# Patient Record
Sex: FEMALE | Race: WHITE
[De-identification: names, ages, dates, MRNs, and addresses within clinical notes are randomized per-mention and may not be internally consistent; named-entity substitution may affect disease eponyms.]

---

## 2017-09-19 ENCOUNTER — HOSPITAL ENCOUNTER (INPATIENT)
Dept: HOSPITAL 92 - SURG A | Age: 72
LOS: 9 days | Discharge: HOME | DRG: 470 | End: 2017-09-28
Attending: ORTHOPAEDIC SURGERY | Admitting: ORTHOPAEDIC SURGERY
Payer: MEDICARE

## 2017-09-19 VITALS — BODY MASS INDEX: 37.8 KG/M2

## 2017-09-19 DIAGNOSIS — I10: ICD-10-CM

## 2017-09-19 DIAGNOSIS — M17.0: Primary | ICD-10-CM

## 2017-09-19 DIAGNOSIS — M10.9: ICD-10-CM

## 2017-09-19 DIAGNOSIS — E66.9: ICD-10-CM

## 2017-09-19 DIAGNOSIS — E78.5: ICD-10-CM

## 2017-09-19 DIAGNOSIS — R12: ICD-10-CM

## 2017-09-19 PROCEDURE — 90471 IMMUNIZATION ADMIN: CPT

## 2017-09-19 PROCEDURE — 36415 COLL VENOUS BLD VENIPUNCTURE: CPT

## 2017-09-19 PROCEDURE — 90732 PPSV23 VACC 2 YRS+ SUBQ/IM: CPT

## 2017-09-19 PROCEDURE — A4306 DRUG DELIVERY SYSTEM <=50 ML: HCPCS

## 2017-09-19 PROCEDURE — 85027 COMPLETE CBC AUTOMATED: CPT

## 2017-09-19 PROCEDURE — C1713 ANCHOR/SCREW BN/BN,TIS/BN: HCPCS

## 2017-09-19 PROCEDURE — C1776 JOINT DEVICE (IMPLANTABLE): HCPCS

## 2017-09-19 PROCEDURE — G0009 ADMIN PNEUMOCOCCAL VACCINE: HCPCS

## 2017-09-26 PROCEDURE — 8E0YXBZ COMPUTER ASSISTED PROCEDURE OF LOWER EXTREMITY: ICD-10-PCS | Performed by: ORTHOPAEDIC SURGERY

## 2017-09-26 PROCEDURE — 0SRD0J9 REPLACEMENT OF LEFT KNEE JOINT WITH SYNTHETIC SUBSTITUTE, CEMENTED, OPEN APPROACH: ICD-10-PCS | Performed by: ORTHOPAEDIC SURGERY

## 2017-09-26 PROCEDURE — 3E0T3CZ: ICD-10-PCS | Performed by: ANESTHESIOLOGY

## 2017-09-26 RX ADMIN — DOCUSATE SODIUM 50 MG AND SENNOSIDES 8.6 MG SCH: 8.6; 5 TABLET, FILM COATED ORAL at 10:14

## 2017-09-26 RX ADMIN — MULTIPLE VITAMINS W/ MINERALS TAB SCH: TAB at 10:14

## 2017-09-26 RX ADMIN — DOCUSATE SODIUM 50 MG AND SENNOSIDES 8.6 MG SCH TAB: 8.6; 5 TABLET, FILM COATED ORAL at 20:58

## 2017-09-26 RX ADMIN — BISOPROLOL FUMARATE AND HYDROCHLOROTHIAZIDE SCH: 10; 6.25 TABLET, FILM COATED ORAL at 13:59

## 2017-09-26 NOTE — RAD
TWO VIEWS LEFT KNEE:

 

Comparison: None. 

 

History: Status post left knee arthroplasty. 

 

FINDINGS: 

Two views of the left knee shows the patient to be status post left knee arthroplasty without periha
rdware lucency of fracture. Air in the soft tissues is from recent surgery. 

 

IMPRESSION: 

Status post left knee arthroplasty without evidence of complication. 

 

POS: [facility]

## 2017-09-26 NOTE — OP
DATE OF PROCEDURE:  09/26/2017

 

PREOPERATIVE DIAGNOSIS:  End-stage tricompartmental osteoarthritis, left knee.

 

POSTOPERATIVE DIAGNOSIS:  End-stage tricompartmental osteoarthritis, left knee.

 

OPERATIVE PROCEDURE:  Cemented cruciate-sparing computer-assisted navigation left total knee arthrop
lasty.

 

SURGEON:  Hugo Kumar M.D.

 

ASSISTANT:  Yousif Bautista PA-C.

 

ANESTHESIA:  General via laryngeal mask airway augmented with indwelling adductor canal block with a
 single shot sciatic block and local infiltration of Marcaine.

 

COMPONENTS USED:  [employer] Triathlon primary size 3 cemented cruciate-sparing femoral com
ponent, size 3 primary cemented tibial component, 11 mm polyethylene fixed bearing insert, and a 27 
mm patellar button.

 

TOURNIQUET TIME:  51 minutes at 300 mmHg.

 

ESTIMATED BLOOD LOSS:  Less than 100.

 

FINDINGS:  End-stage severe degenerative tricompartmental disease, bone on bone arthrosis, periartic
ular osteophyte formation, large serous effusion, hypertrophic synovium, and changes consistent with
 degenerative genu varum.

 

DRAINS:  None.

 

SPECIMENS:  None.

 

COMPLICATIONS:  None.

 

COUNTS:  Correct.

 

INDICATIONS FOR SURGERY:  Ms. Mccormick is a 72-year-old white female who has had progressive left knee 
pain amplified with standing and walking for the last 5-7 years.  She has failed conservative manage
ment and elected to proceed with total knee arthroplasty as definitive treatment of her pain.

 

PROCEDURE IN DETAIL:  After informed consent was obtained in the preoperative holding area.  The pat
ient was taken to the operative suite where general anesthesia was induced.  Once adequate level of 
general anesthesia was obtained, the patient was positioned and a well-padded tourniquet was placed 
around the left proximal thigh.  The left lower extremity was then prepped and draped in the usual s
terile fashion.  Prior to exsanguination, a time out was called and all members of the surgical team
 agreed upon site, surgeon, and patient.  The extremity was then exsanguinated and the tourniquet wa
s raised.  A midline longitudinal incision was then made directly over the patella extending two fin
gerbreadths above the superior pole of the patella and two fingerbreadths inferior to the inferior p
atellar pole of the patella.  Deeper subcutaneous layers were dissected sharply and local bleeding w
as controlled with Bovie electrocautery.  A quad tendon longitudinal split was then made sharply and
 a median parapatellar arthrotomy was carried out both sharp and with Bovie electrocautery, carried 
down to one fingerbreadth medial to the tibial tubercle.  The knee was then placed into flexion and 
the patella was everted nicely, and a copious fat pad ectomy was performed allowing for greater expo
sure of the tibia.  The computer-assisted distal femoral fiducial was then placed and pinned firmly,
 and the distal femoral cutting guide was pinned firmly into place.  The oscillating saw was then us
ed to remove the appropriate amount of bone.  The 4-in-1 cutting block was then placed on the distal
 femur and the oscillating saw was used to remove the appropriate amount of bone off of the anterior
, posterior, and chamfer cuts.  After completion of bone cuts, the anterior cruciate ligament was re
sected sharply and the posterior cruciate ligament retractor was placed and the tibia was subluxed f
or better exposure.  Partial meniscectomies were carried out, and the tibial computer-assisted fiduc
ial was pinned, and the cutting guide was placed.  Oscillating saw was then used to remove the bone 
with Hohmann retractors used to take care and protect the collateral ligaments.  After the tibial re
section was performed, a laminar  was placed in between the freshened bone cuts.  The knee p
laced at 90 degrees and further bilateral meniscectomies were carried out, and the curved osteotome 
and curettage was used to remove any excess bone spurs in the posterior compartment.  The trial femo
ral component, tibial baseplate were placed with the appropriate polyethylene trial insert with an a
ppropriate polyethylene spacer and patellar button.  The knee was taken through full range of motion
 with flexion and extension from 0-90 degrees and patellar broach squarely in the trochlea without a
ny squinting or subluxation noted.  The knee was also stable to varus and valgus stressing at 0, 15,
 45, and 90 degrees of flexion.  The drawer was negative.  All trial components were then removed an
d the keel punch was used to provide the appropriate defect in the tibia with a mallet.  The freshen
ed bone cuts were copiously irrigated with pulsatile lavage of about 1-1/2 liters to remove all exce
ss debris.  The freshened bone cuts were then dried and with suction and lap sponge.  The knee was p
laced in flexion and retractors were placed to provide access to all bone cuts.  Tobramycin impregna
nithya methyl methacrylate cement was then placed on the freshened bone cuts and implants which were ma
lleted firmly into place.  Curettage and Calion elevators were used to remove any excess bone cement.
  The knee was placed into full extension and the patellar button was placed under compression, and 
the cement was allowed to cure.  Once completed, the components were again taken through full range 
of motion and copious irrigation of the knee was carried out with another liter of normal saline.  A
ll components were inspected fully with full range of motion and varus and valgus stressing.  There 
was no laxity noted and full extension was observed clinically.  Primary closure was accomplished wi
th #2 interrupted Vicryl stitch of the arthrotomy defect.  This was oversewn with a #2 running Quill
 barbed stitch.  The subcutaneous layer was then closed with a running 0 barbed Monocryl stitch and 
skin closure accomplished with a running subcuticular 3-0 Monocryl barbed Quill stitch and augmented
 with cement on the skin.  Tourniquet was lowered.  Good spontaneous return of distal pulses was not
ed clinically and a sterile dressing was applied to the incision.  The procedure was terminated with
out any complications.  The patient was awakened in the operative suite and taken to the recovery ro
om in stable condition.

## 2017-09-26 NOTE — PDOC.PN
- Subjective


Encounter Start Date: 09/26/17


Encounter Start Time: 14:00





Pt seen for management of medical comorbidities, including hypertension.  

Denies chest pain, shortness of breath, fevers or chills.





- Objective


MAR Reviewed: Yes


Vital Signs & Weight: 


 Vital Signs (12 hours)











  Temp Pulse Resp BP Pulse Ox


 


 09/26/17 09:35  97.5 F L  67  18  131/76  98








 Weight











Weight                         200 lb

















Phys Exam





- Physical Examination


Obese


HEENT: moist MMs, oral pharynx no lesions


Neck: supple, full ROM


Respiratory: no wheezing, no rales, no rhonchi, clear to auscultation bilateral


Cardiovascular: RRR, no rub


Gastrointestinal: soft, positive bowel sounds


Musculoskeletal: pulses present


s/p L knee surgery


Neurological: moves all 4 limbs


Psychiatric: normal affect


Skin: no rash





Dx/Plan


(1) Hypertension


Code(s): I10 - ESSENTIAL (PRIMARY) HYPERTENSION   Status: Chronic   





(2) Dyslipidemia


Code(s): E78.5 - HYPERLIPIDEMIA, UNSPECIFIED   Status: Chronic   





(3) Gout


Code(s): M10.9 - GOUT, UNSPECIFIED   Status: Chronic   





- Plan





* .


Monitor vital signs, titrate antihypertensives as needed.


Continue statin.


Gout stable.


s/p L TKR


DVT prophylaxis and pain management per orthopedic surgery service.


Code status:  Full











Review of Systems





- Review of Systems


Respiratory: negative: Cough, Dry, Shortness of Breath, Hemoptysis, SOB with 

Excertion, Pleuritic Pain, Sputum, Wheezing


Cardiovascular: negative: Chest Pain, Palpitations, Orthopnea, Paroxysmal Noc. 

Dyspnea, Edema, Light Headedness


Gastrointestinal: negative: Nausea, Vomiting, Abdominal Pain, Diarrhea, 

Constipation, Melena, Hematochezia





- Medications/Allergies


Allergies/Adverse Reactions: 


 Allergies











Allergy/AdvReac Type Severity Reaction Status Date / Time


 


Sulfa (Sulfonamide Allergy  Hives Verified 09/19/17 14:18





Antibiotics)     











Medications: 


 Current Medications





Acetaminophen (Tylenol)  650 mg PO Q4H PRN


   PRN Reason: HA/ T > 101F; Mild Pain (1-3)


Hydrocodone Bitart/Acetaminophen (Norco 10/325)  1 tab PO Q4H PRN


   PRN Reason: Pain (1-3)


Hydrocodone Bitart/Acetaminophen (Norco 10/325)  2 tab PO Q4H PRN


   PRN Reason: PAIN (4-6)


Allopurinol (Zyloprim)  100 mg PO ASDIR PRN


   PRN Reason: GOUT


Aspirin (Aspirin)  325 mg PO BID [facility]


   Last Admin: 09/26/17 09:35 Dose:  Not Given


Atorvastatin Calcium (Lipitor)  20 mg PO HS [facility]


Bisoprolol Fumarate/HCTZ (Ziac 10-6.25)  0.5 tab PO QAM [facility]


   Last Admin: 09/26/17 13:59 Dose:  Not Given


Cefazolin Sodium (Ancef)  2 gm SLOW IVP 1500,2300 [facility]


   Stop: 09/26/17 23:01


Diphenhydramine HCl (Benadryl)  25 mg PO Q6H PRN


   PRN Reason: Itching


Doxazosin Mesylate (Cardura)  4 mg PO HS [facility]


Famotidine (Pepcid)  20 mg PO QAM [facility]


   Last Admin: 09/26/17 09:35 Dose:  Not Given


Fentanyl (Sublimaze)  50 mcg IV Q1H PRN


   PRN Reason:  BREAKTHROUGH PAIN


   Last Admin: 09/26/17 12:12 Dose:  50 mcg


Ferrous Gluconate (Fergon)  324 mg PO BID [facility]


   Last Admin: 09/26/17 10:14 Dose:  Not Given


Sodium Chloride (Normal Saline 0.9%)  1,000 mls @ 100 mls/hr IV .Q10H [facility]


Tranexamic Acid 1,000 mg/ (Sodium Chloride)  110 mls @ 200 mls/hr IVPB ONE [facility]


   Stop: 09/26/17 21:00


Ropivacaine (Ropivacaine 0.2% 550 Ml)  550 mls @ 0 mls/hr NERVE BLCK INF [facility]


   PRN Reason: As Directed


Iron/Minerals/Multivitamins (Theragran M)  1 tab PO DAILY [facility]


   Last Admin: 09/26/17 10:14 Dose:  Not Given


Ketorolac Tromethamine (Toradol)  15 mg IVP 0300,0900,1500,2100 [facility]


   Stop: 09/28/17 09:01


Montelukast Sodium (Singulair)  10 mg PO ASDIR PRN


   PRN Reason: Allergies


Ondansetron HCl (Zofran)  4 mg IVP Q6H PRN


   PRN Reason: Nausea/Vomiting


Pneumococcal Polyvalent Vaccine (Pneumovax 23)  0.5 ml IM .ONCE ONE


   Stop: 09/26/17 21:01


Promethazine HCl (Phenergan)  12.5 mg IM Q4H PRN


   PRN Reason: Nausea


Senna/Docusate Sodium (Senokot S)  2 tab PO BID [facility]


   Last Admin: 09/26/17 10:14 Dose:  Not Given


Sodium Chloride (Flush - Normal Saline)  10 ml IVF PRN PRN


   PRN Reason: Saline Flush


Tramadol HCl (Ultram)  50 mg PO Q6H PRN


   PRN Reason: Mild Pain (1-3)


Tramadol HCl (Ultram)  100 mg PO Q6H PRN


   PRN Reason: Moderate Pain 4-6


Zolpidem Tartrate (Ambien)  5 mg PO HSPRN PRN


   PRN Reason: Insomnia

## 2017-09-27 LAB
HCT VFR BLD CALC: 35.3 % (ref 36–47)
RBC # BLD AUTO: 3.8 MILL/UL (ref 4.2–5.4)
WBC # BLD AUTO: 23.8 THOU/UL (ref 4.8–10.8)

## 2017-09-27 RX ADMIN — BISOPROLOL FUMARATE AND HYDROCHLOROTHIAZIDE SCH TAB: 10; 6.25 TABLET, FILM COATED ORAL at 09:05

## 2017-09-27 RX ADMIN — HYDROCODONE BITARTRATE AND ACETAMINOPHEN PRN TAB: 10; 325 TABLET ORAL at 13:02

## 2017-09-27 RX ADMIN — HYDROCODONE BITARTRATE AND ACETAMINOPHEN PRN TAB: 10; 325 TABLET ORAL at 19:23

## 2017-09-27 RX ADMIN — HYDROCODONE BITARTRATE AND ACETAMINOPHEN PRN TAB: 10; 325 TABLET ORAL at 09:02

## 2017-09-27 RX ADMIN — MULTIPLE VITAMINS W/ MINERALS TAB SCH TAB: TAB at 09:05

## 2017-09-27 RX ADMIN — DOCUSATE SODIUM 50 MG AND SENNOSIDES 8.6 MG SCH TAB: 8.6; 5 TABLET, FILM COATED ORAL at 09:08

## 2017-09-27 RX ADMIN — DOCUSATE SODIUM 50 MG AND SENNOSIDES 8.6 MG SCH TAB: 8.6; 5 TABLET, FILM COATED ORAL at 20:07

## 2017-09-27 NOTE — PDOC.PN
- Subjective


Encounter Start Date: 09/27/17


Encounter Start Time: 09:20





Pt seen for followup re; hypertension.  Denies chest pain, shortness of breath, 

fevers or chills.  No nausea or vomiting.  No cough.  Has cisneros catheter, 

denies dysuria or urge to urinate.





- Objective


MAR Reviewed: Yes


Vital Signs & Weight: 


 Vital Signs (12 hours)











  Temp Pulse Resp BP BP Pulse Ox


 


 09/27/17 11:25  97.1 F L  56 L  14   130/58 L  93 L


 


 09/27/17 08:00  97.1 F L  56 L  14    96


 


 09/27/17 07:33  97.5 F L  62  18   126/74  96


 


 09/27/17 04:52  97.4 F L  71  18  146/72 H   92 L








 Weight











Admit Weight                   200 lb


 


Weight                         200 lb














I&O: 


 











 09/26/17 09/27/17 09/28/17





 06:59 06:59 06:59


 


Intake Total  3106 


 


Output Total  2450 


 


Balance  656 











Result Diagrams: 


 09/27/17 05:07








Phys Exam





- Physical Examination


Constitutional: NAD


HEENT: moist MMs, oral pharynx no lesions


Neck: supple, full ROM


Respiratory: no wheezing, no rales, no rhonchi, clear to auscultation bilateral


Cardiovascular: RRR, no rub


Gastrointestinal: soft, positive bowel sounds


Musculoskeletal: pulses present


s/p L knee surgery


Neurological: moves all 4 limbs


Psychiatric: normal affect





Dx/Plan


(1) Hypertension


Code(s): I10 - ESSENTIAL (PRIMARY) HYPERTENSION   Status: Chronic   





(2) Dyslipidemia


Code(s): E78.5 - HYPERLIPIDEMIA, UNSPECIFIED   Status: Chronic   





(3) Gout


Code(s): M10.9 - GOUT, UNSPECIFIED   Status: Chronic   





- Plan





* .


Leucocytosis, but no signs of infection.  Continue to monitor.  Recheck CBC.


Titrate antihypertensives as needed.


Continue statin.








Review of Systems





- Review of Systems


Constitutional: negative: Fever, Chills, Sweats, Weakness, Malaise


Respiratory: negative: Cough, Dry, Shortness of Breath, Hemoptysis, SOB with 

Excertion, Pleuritic Pain, Sputum, Wheezing


Cardiovascular: negative: Chest Pain, Palpitations, Orthopnea, Paroxysmal Noc. 

Dyspnea, Edema, Light Headedness, Other


Genitourinary: negative: Dysuria, Frequency, Incontinence, Hematuria, Retention





- Medications/Allergies


Allergies/Adverse Reactions: 


 Allergies











Allergy/AdvReac Type Severity Reaction Status Date / Time


 


Sulfa (Sulfonamide Allergy  Hives Verified 09/19/17 14:18





Antibiotics)     











Medications: 


 Current Medications





Acetaminophen (Tylenol)  650 mg PO Q4H PRN


   PRN Reason: HA/ T > 101F; Mild Pain (1-3)


Hydrocodone Bitart/Acetaminophen (Norco 10/325)  1 tab PO Q4H PRN


   PRN Reason: Pain (1-3)


   Last Admin: 09/27/17 04:56 Dose:  1 tab


Hydrocodone Bitart/Acetaminophen (Norco 10/325)  2 tab PO Q4H PRN


   PRN Reason: PAIN (4-6)


   Last Admin: 09/27/17 13:02 Dose:  2 tab


Allopurinol (Zyloprim)  100 mg PO ASDIR PRN


   PRN Reason: GOUT


Aspirin (Aspirin)  325 mg PO BID [facility]


   Last Admin: 09/27/17 09:07 Dose:  325 mg


Atorvastatin Calcium (Lipitor)  20 mg PO [facility]


   Last Admin: 09/26/17 20:57 Dose:  20 mg


Bisoprolol Fumarate/HCTZ (Ziac 10-6.25)  0.5 tab PO QAM [facility]


   Last Admin: 09/27/17 09:05 Dose:  0.5 tab


Diphenhydramine HCl (Benadryl)  25 mg PO Q6H PRN


   PRN Reason: Itching


Doxazosin Mesylate (Cardura)  4 mg PO [facility]


   Last Admin: 09/26/17 21:12 Dose:  4 mg


Famotidine (Pepcid)  20 mg PO QAM [facility]


   Last Admin: 09/27/17 09:07 Dose:  20 mg


Fentanyl (Sublimaze)  50 mcg IV Q1H PRN


   PRN Reason:  BREAKTHROUGH PAIN


   Last Admin: 09/26/17 12:12 Dose:  50 mcg


Ferrous Gluconate (Fergon)  324 mg PO BID [facility]


   Last Admin: 09/27/17 09:04 Dose:  324 mg


Sodium Chloride (Normal Saline 0.9%)  1,000 mls @ 100 mls/hr IV .Q10H [facility]


   Last Admin: 09/27/17 13:08 Dose:  Not Given


Ropivacaine (Ropivacaine 0.2% 550 Ml)  550 mls @ 0 mls/hr NERVE BLCK INF [facility]


   PRN Reason: As Directed


Iron/Minerals/Multivitamins (Theragran M)  1 tab PO DAILY [facility]


   Last Admin: 09/27/17 09:05 Dose:  1 tab


Ketorolac Tromethamine (Toradol)  15 mg IVP 0300,0900,1500,2100 [facility]


   Stop: 09/28/17 09:01


   Last Admin: 09/27/17 09:09 Dose:  15 mg


Montelukast Sodium (Singulair)  10 mg PO ASDIR PRN


   PRN Reason: Allergies


Ondansetron HCl (Zofran)  4 mg IVP Q6H PRN


   PRN Reason: Nausea/Vomiting


Promethazine HCl (Phenergan)  12.5 mg IM Q4H PRN


   PRN Reason: Nausea


Senna/Docusate Sodium (Senokot S)  2 tab PO BID [facility]


   Last Admin: 09/27/17 09:08 Dose:  2 tab


Sodium Chloride (Flush - Normal Saline)  10 ml IVF PRN PRN


   PRN Reason: Saline Flush


Tramadol HCl (Ultram)  50 mg PO Q6H PRN


   PRN Reason: Mild Pain (1-3)


Tramadol HCl (Ultram)  100 mg PO Q6H PRN


   PRN Reason: Moderate Pain 4-6


   Last Admin: 09/27/17 13:49 Dose:  100 mg


Zolpidem Tartrate (Ambien)  5 mg PO HSPRN PRN


   PRN Reason: Insomnia

## 2017-09-28 VITALS — TEMPERATURE: 97.3 F | SYSTOLIC BLOOD PRESSURE: 166 MMHG | DIASTOLIC BLOOD PRESSURE: 67 MMHG

## 2017-09-28 LAB
HCT VFR BLD CALC: 33.6 % (ref 36–47)
RBC # BLD AUTO: 3.6 MILL/UL (ref 4.2–5.4)
WBC # BLD AUTO: 16.1 THOU/UL (ref 4.8–10.8)

## 2017-09-28 RX ADMIN — DOCUSATE SODIUM 50 MG AND SENNOSIDES 8.6 MG SCH TAB: 8.6; 5 TABLET, FILM COATED ORAL at 08:41

## 2017-09-28 RX ADMIN — HYDROCODONE BITARTRATE AND ACETAMINOPHEN PRN TAB: 10; 325 TABLET ORAL at 13:57

## 2017-09-28 RX ADMIN — HYDROCODONE BITARTRATE AND ACETAMINOPHEN PRN TAB: 10; 325 TABLET ORAL at 02:40

## 2017-09-28 RX ADMIN — BISOPROLOL FUMARATE AND HYDROCHLOROTHIAZIDE SCH TAB: 10; 6.25 TABLET, FILM COATED ORAL at 08:41

## 2017-09-28 RX ADMIN — Medication PRN ML: at 08:25

## 2017-09-28 RX ADMIN — Medication PRN ML: at 08:34

## 2017-09-28 RX ADMIN — HYDROCODONE BITARTRATE AND ACETAMINOPHEN PRN TAB: 10; 325 TABLET ORAL at 06:55

## 2017-09-28 RX ADMIN — MULTIPLE VITAMINS W/ MINERALS TAB SCH TAB: TAB at 08:41

## 2017-09-28 NOTE — PDOC.PN
- Subjective


Encounter Start Date: 09/28/17


Encounter Start Time: 08:40





Pt seen for followup re: hypertension.  Denies chest pain, shortness of breath, 

fevers or chills.





- Objective


MAR Reviewed: Yes


Vital Signs & Weight: 


 Vital Signs (12 hours)











  Temp Pulse Resp BP BP Pulse Ox


 


 09/28/17 11:00  97.3 F L  62  16   166/67 H  95


 


 09/28/17 07:45  97.7 F  67  16   144/67 H  93 L


 


 09/28/17 04:29  97.7 F  63  16  128/71   91 L








 Weight











Admit Weight                   200 lb


 


Weight                         200 lb














I&O: 


 











 09/27/17 09/28/17 09/29/17





 06:59 06:59 06:59


 


Intake Total 3106 1170 


 


Output Total 2450 1050 


 


Balance 656 120 











Result Diagrams: 


 09/28/17 04:31








Phys Exam





- Physical Examination


Obese


HEENT: moist MMs


Neck: supple


Respiratory: no wheezing, no rales, no rhonchi, clear to auscultation bilateral


Cardiovascular: RRR


Gastrointestinal: soft, positive bowel sounds


s/p L knee surgery


Neurological: moves all 4 limbs


Psychiatric: normal affect





Dx/Plan


(1) Hypertension


Code(s): I10 - ESSENTIAL (PRIMARY) HYPERTENSION   Status: Chronic   





(2) Dyslipidemia


Code(s): E78.5 - HYPERLIPIDEMIA, UNSPECIFIED   Status: Chronic   





(3) Gout


Code(s): M10.9 - GOUT, UNSPECIFIED   Status: Chronic   





- Plan





* .


Leucocytosis improving.


No fevers.


Monitor vital signs, titrate antihypertensives as needed.


Plan to discharge patient noted, will sign off.





Review of Systems





- Review of Systems


Constitutional: negative: Fever, Chills, Sweats, Weakness, Malaise


Respiratory: negative: Cough, Dry, Shortness of Breath, Hemoptysis, SOB with 

Excertion, Sputum, Wheezing


Cardiovascular: negative: Chest Pain, Palpitations, Orthopnea, Paroxysmal Noc. 

Dyspnea





- Medications/Allergies


Allergies/Adverse Reactions: 


 Allergies











Allergy/AdvReac Type Severity Reaction Status Date / Time


 


Sulfa (Sulfonamide Allergy  Hives Verified 09/19/17 14:18





Antibiotics)     











Medications: 


 Current Medications





Acetaminophen (Tylenol)  650 mg PO Q4H PRN


   PRN Reason: HA/ T > 101F; Mild Pain (1-3)


Hydrocodone Bitart/Acetaminophen (Norco 10/325)  1 tab PO Q4H PRN


   PRN Reason: Pain (1-3)


   Last Admin: 09/27/17 04:56 Dose:  1 tab


Hydrocodone Bitart/Acetaminophen (Norco 10/325)  2 tab PO Q4H PRN


   PRN Reason: PAIN (4-6)


   Last Admin: 09/28/17 06:55 Dose:  2 tab


Allopurinol (Zyloprim)  100 mg PO ASDIR PRN


   PRN Reason: GOUT


Aspirin (Aspirin)  325 mg PO BID [facility]


   Last Admin: 09/28/17 08:42 Dose:  325 mg


Atorvastatin Calcium (Lipitor)  20 mg PO HS [facility]


   Last Admin: 09/27/17 20:07 Dose:  20 mg


Bisoprolol Fumarate/HCTZ (Ziac 10-6.25)  0.5 tab PO QAM [facility]


   Last Admin: 09/28/17 08:41 Dose:  0.5 tab


Diphenhydramine HCl (Benadryl)  25 mg PO Q6H PRN


   PRN Reason: Itching


Doxazosin Mesylate (Cardura)  4 mg PO [facility]


   Last Admin: 09/27/17 20:12 Dose:  4 mg


[facility])  [facility]


   Last Admin: 09/28/17 08:42 Dose:  20 mg


Fentanyl (Sublimaze)  50 mcg IV Q1H PRN


   PRN Reason:  BREAKTHROUGH PAIN


   Last Admin: 09/26/17 12:12 Dose:  50 mcg


Ferrous Gluconate (Fergon)  324 mg PO BID [facility]


   Last Admin: 09/28/17 08:42 Dose:  324 mg


Sodium Chloride (Normal Saline 0.9%)  1,000 mls @ 100 mls/hr IV .Q10H [facility]


   Last Admin: 09/28/17 09:25 Dose:  Not Given


Ropivacaine (Ropivacaine 0.2% 550 Ml)  550 mls @ 0 mls/hr NERVE BLCK INF [facility]


   PRN Reason: As Directed


Iron/Minerals/Multivitamins (Theragran M)  1 tab PO DAILY [facility]


   Last Admin: 09/28/17 08:41 Dose:  1 tab


Montelukast Sodium (Singulair)  10 mg PO ASDIR PRN


   PRN Reason: Allergies


Ondansetron HCl (Zofran)  4 mg IVP Q6H PRN


   PRN Reason: Nausea/Vomiting


   Last Admin: 09/28/17 08:21 Dose:  4 mg


Promethazine HCl (Phenergan)  12.5 mg IM Q4H PRN


   PRN Reason: Nausea


Senna/Docusate Sodium (Senokot S)  2 tab PO BID [facility]


   Last Admin: 09/28/17 08:41 Dose:  2 tab


Sodium Chloride (Flush - Normal Saline)  10 ml IVF PRN PRN


   PRN Reason: Saline Flush


   Last Admin: 09/28/17 08:34 Dose:  10 ml


Tramadol HCl (Ultram)  50 mg PO Q6H PRN


   PRN Reason: Mild Pain (1-3)


Tramadol HCl (Ultram)  100 mg PO Q6H PRN


   PRN Reason: Moderate Pain 4-6


   Last Admin: 09/27/17 13:49 Dose:  100 mg


Zolpidem Tartrate (Ambien)  5 mg PO HSPRN PRN


   PRN Reason: Insomnia

## 2018-10-15 ENCOUNTER — HOSPITAL ENCOUNTER (OUTPATIENT)
Dept: HOSPITAL 92 - LABBT | Age: 73
Discharge: HOME | End: 2018-10-15
Attending: ORTHOPAEDIC SURGERY
Payer: MEDICARE

## 2018-10-15 DIAGNOSIS — M17.11: ICD-10-CM

## 2018-10-15 DIAGNOSIS — Z01.818: Primary | ICD-10-CM

## 2018-10-15 LAB
ANION GAP SERPL CALC-SCNC: 13 MMOL/L (ref 10–20)
APTT PPP: 30.8 SEC (ref 22.9–36.1)
BACTERIA UR QL AUTO: (no result) HPF
BASOPHILS # BLD AUTO: 0.1 THOU/UL (ref 0–0.2)
BASOPHILS NFR BLD AUTO: 0.4 % (ref 0–1)
BUN SERPL-MCNC: 18 MG/DL (ref 9.8–20.1)
CALCIUM SERPL-MCNC: 9.4 MG/DL (ref 7.8–10.44)
CHLORIDE SERPL-SCNC: 104 MMOL/L (ref 98–107)
CO2 SERPL-SCNC: 27 MMOL/L (ref 23–31)
CREAT CL PREDICTED SERPL C-G-VRATE: 0 ML/MIN (ref 70–130)
CRYSTAL-AUWI FLAG: 0.4 (ref 0–15)
EOSINOPHIL # BLD AUTO: 0.4 THOU/UL (ref 0–0.7)
EOSINOPHIL NFR BLD AUTO: 2.9 % (ref 0–10)
GLUCOSE SERPL-MCNC: 108 MG/DL (ref 83–110)
HEV IGM SER QL: 1.7 (ref 0–7.99)
HGB BLD-MCNC: 12.9 G/DL (ref 12–16)
HYALINE CASTS #/AREA URNS LPF: (no result) LPF
INR PPP: 1
LYMPHOCYTES # BLD: 3 THOU/UL (ref 1.2–3.4)
LYMPHOCYTES NFR BLD AUTO: 23 % (ref 21–51)
MCH RBC QN AUTO: 30 PG (ref 27–31)
MCV RBC AUTO: 91.1 FL (ref 78–98)
MONOCYTES # BLD AUTO: 0.9 THOU/UL (ref 0.11–0.59)
MONOCYTES NFR BLD AUTO: 7.3 % (ref 0–10)
NEUTROPHILS # BLD AUTO: 8.5 THOU/UL (ref 1.4–6.5)
NEUTROPHILS NFR BLD AUTO: 66.3 % (ref 42–75)
PATHC CAST-AUWI FLAG: 0.29 (ref 0–2.49)
PLATELET # BLD AUTO: 290 THOU/UL (ref 130–400)
POTASSIUM SERPL-SCNC: 4.5 MMOL/L (ref 3.5–5.1)
PROTHROMBIN TIME: 13 SEC (ref 12–14.7)
RBC # BLD AUTO: 4.29 MILL/UL (ref 4.2–5.4)
RBC UR QL AUTO: (no result) HPF (ref 0–3)
SODIUM SERPL-SCNC: 139 MMOL/L (ref 136–145)
SP GR UR STRIP: 1.01 (ref 1–1.04)
SPERM-AUWI FLAG: 0 (ref 0–9.9)
WBC # BLD AUTO: 12.8 THOU/UL (ref 4.8–10.8)
WBC UR QL AUTO: (no result) HPF (ref 0–3)
YEAST-AUWI FLAG: 0 (ref 0–25)

## 2018-10-15 PROCEDURE — 93010 ELECTROCARDIOGRAM REPORT: CPT

## 2018-10-15 PROCEDURE — 86900 BLOOD TYPING SEROLOGIC ABO: CPT

## 2018-10-15 PROCEDURE — 71046 X-RAY EXAM CHEST 2 VIEWS: CPT

## 2018-10-15 PROCEDURE — 86901 BLOOD TYPING SEROLOGIC RH(D): CPT

## 2018-10-15 PROCEDURE — 93005 ELECTROCARDIOGRAM TRACING: CPT

## 2018-10-15 PROCEDURE — 80048 BASIC METABOLIC PNL TOTAL CA: CPT

## 2018-10-15 PROCEDURE — 85730 THROMBOPLASTIN TIME PARTIAL: CPT

## 2018-10-15 PROCEDURE — 85025 COMPLETE CBC W/AUTO DIFF WBC: CPT

## 2018-10-15 PROCEDURE — 86850 RBC ANTIBODY SCREEN: CPT

## 2018-10-15 PROCEDURE — 85610 PROTHROMBIN TIME: CPT

## 2018-10-15 PROCEDURE — 81001 URINALYSIS AUTO W/SCOPE: CPT

## 2018-10-15 NOTE — RAD
PA AND LATERAL CHEST:

 

History: Pre op. 

 

Comparison: 9-19-17

 

FINDINGS: 

Heart size is upper limits of normal with atherosclerotic changes of the aorta. Lungs are clear of in
filtrates. 

 

IMPRESSION: 

Heart size upper limits of normal. No active intrathoracic disease. 

 

POS: [facility]

## 2018-10-16 ENCOUNTER — HOSPITAL ENCOUNTER (INPATIENT)
Dept: HOSPITAL 92 - SDC | Age: 73
LOS: 5 days | Discharge: HOME | DRG: 469 | End: 2018-10-21
Attending: ORTHOPAEDIC SURGERY | Admitting: ORTHOPAEDIC SURGERY
Payer: MEDICARE

## 2018-10-16 VITALS — BODY MASS INDEX: 39 KG/M2

## 2018-10-16 DIAGNOSIS — Z88.2: ICD-10-CM

## 2018-10-16 DIAGNOSIS — J42: ICD-10-CM

## 2018-10-16 DIAGNOSIS — Z96.652: ICD-10-CM

## 2018-10-16 DIAGNOSIS — M10.9: ICD-10-CM

## 2018-10-16 DIAGNOSIS — E66.9: ICD-10-CM

## 2018-10-16 DIAGNOSIS — K21.9: ICD-10-CM

## 2018-10-16 DIAGNOSIS — I50.33: ICD-10-CM

## 2018-10-16 DIAGNOSIS — I13.0: ICD-10-CM

## 2018-10-16 DIAGNOSIS — M17.11: Primary | ICD-10-CM

## 2018-10-16 DIAGNOSIS — E87.6: ICD-10-CM

## 2018-10-16 DIAGNOSIS — I24.8: ICD-10-CM

## 2018-10-16 DIAGNOSIS — N18.2: ICD-10-CM

## 2018-10-16 PROCEDURE — 0SRC0J9 REPLACEMENT OF RIGHT KNEE JOINT WITH SYNTHETIC SUBSTITUTE, CEMENTED, OPEN APPROACH: ICD-10-PCS | Performed by: ORTHOPAEDIC SURGERY

## 2018-10-16 PROCEDURE — 94760 N-INVAS EAR/PLS OXIMETRY 1: CPT

## 2018-10-16 PROCEDURE — 84100 ASSAY OF PHOSPHORUS: CPT

## 2018-10-16 PROCEDURE — 86900 BLOOD TYPING SEROLOGIC ABO: CPT

## 2018-10-16 PROCEDURE — 81001 URINALYSIS AUTO W/SCOPE: CPT

## 2018-10-16 PROCEDURE — 85027 COMPLETE CBC AUTOMATED: CPT

## 2018-10-16 PROCEDURE — 94640 AIRWAY INHALATION TREATMENT: CPT

## 2018-10-16 PROCEDURE — 86850 RBC ANTIBODY SCREEN: CPT

## 2018-10-16 PROCEDURE — 83880 ASSAY OF NATRIURETIC PEPTIDE: CPT

## 2018-10-16 PROCEDURE — 84484 ASSAY OF TROPONIN QUANT: CPT

## 2018-10-16 PROCEDURE — 71046 X-RAY EXAM CHEST 2 VIEWS: CPT

## 2018-10-16 PROCEDURE — 85610 PROTHROMBIN TIME: CPT

## 2018-10-16 PROCEDURE — 93010 ELECTROCARDIOGRAM REPORT: CPT

## 2018-10-16 PROCEDURE — 85025 COMPLETE CBC W/AUTO DIFF WBC: CPT

## 2018-10-16 PROCEDURE — 83735 ASSAY OF MAGNESIUM: CPT

## 2018-10-16 PROCEDURE — 80048 BASIC METABOLIC PNL TOTAL CA: CPT

## 2018-10-16 PROCEDURE — C1713 ANCHOR/SCREW BN/BN,TIS/BN: HCPCS

## 2018-10-16 PROCEDURE — 86901 BLOOD TYPING SEROLOGIC RH(D): CPT

## 2018-10-16 PROCEDURE — C1776 JOINT DEVICE (IMPLANTABLE): HCPCS

## 2018-10-16 PROCEDURE — 36415 COLL VENOUS BLD VENIPUNCTURE: CPT

## 2018-10-16 PROCEDURE — 85730 THROMBOPLASTIN TIME PARTIAL: CPT

## 2018-10-16 PROCEDURE — 71045 X-RAY EXAM CHEST 1 VIEW: CPT

## 2018-10-16 PROCEDURE — 93005 ELECTROCARDIOGRAM TRACING: CPT

## 2018-10-16 RX ADMIN — DOCUSATE SODIUM 50 MG AND SENNOSIDES 8.6 MG SCH: 8.6; 5 TABLET, FILM COATED ORAL at 15:06

## 2018-10-16 RX ADMIN — ASPIRIN SCH: 81 TABLET ORAL at 15:06

## 2018-10-16 RX ADMIN — HYDROCODONE BITARTRATE AND ACETAMINOPHEN PRN TAB: 7.5; 325 TABLET ORAL at 16:25

## 2018-10-16 RX ADMIN — Medication SCH GM: at 17:21

## 2018-10-16 RX ADMIN — HYDROCODONE BITARTRATE AND ACETAMINOPHEN PRN TAB: 7.5; 325 TABLET ORAL at 20:49

## 2018-10-16 RX ADMIN — DOCUSATE SODIUM 50 MG AND SENNOSIDES 8.6 MG SCH TAB: 8.6; 5 TABLET, FILM COATED ORAL at 20:40

## 2018-10-16 RX ADMIN — ASPIRIN SCH MG: 81 TABLET ORAL at 20:40

## 2018-10-16 NOTE — PDOC.PN
- Subjective


Encounter Start Date: 10/16/18


Encounter Start Time: 18:00





Patient seen and examined for med mngt. No CP/SOB/Palpitations.  No new 

complaints.





- Objective


MAR Reviewed: Yes


Vital Signs & Weight: 


 Vital Signs (12 hours)











  Temp Pulse Resp BP Pulse Ox


 


 10/16/18 21:15  98.3 F  84  16  123/73  90 L


 


 10/16/18 20:38      90 L


 


 10/16/18 14:35  97.6 F  76  18  110/70  94 L








 Weight











Weight                         200 lb














I&O: 


 











 10/15/18 10/16/18 10/17/18





 06:59 06:59 06:59


 


Intake Total   800


 


Output Total   300


 


Balance   500











Result Diagrams: 


 10/17/18 04:37





Additional Labs: 





 Laboratory Tests











  10/15/18





  11:23


 


Anion Gap  13


 


Creatinine  0.76


 


Estimated GFR (MDRD)  74











EKG Reviewed by me: Yes (SR)





Phys Exam





- Physical Examination


Constitutional: NAD


Neck: no JVD


Respiratory: no wheezing, no rhonchi


Cardiovascular: RRR, no rub


Gastrointestinal: soft, non-tender, no distention, positive bowel sounds


Musculoskeletal: no edema


Neurological: non-focal, moves all 4 limbs


Psychiatric: A&O x 3





Dx/Plan





- Plan


DVT proph w/SCDs





IMPRESSION:


1. HTN


2. Obesity BMI 39.1


3. Gout


4. CKD 2





PLAN:


Reduce Ziac and Lotrel dose to 1/2 with holding parameters - Will titrate as 

needed.


Cont Allopurinol


HH in AM


Will follow. 


Full code. DPOA- spouse.








Review of Systems





- Review of Systems


Respiratory: negative: Cough, Dry, Shortness of Breath, Hemoptysis, SOB with 

Excertion, Pleuritic Pain, Sputum, Wheezing


Cardiovascular: negative: chest pain, palpitations, orthopnea, paroxysmal 

nocturnal dyspnea, edema, light headedness, other





- Medications/Allergies


Allergies/Adverse Reactions: 


 Allergies











Allergy/AdvReac Type Severity Reaction Status Date / Time


 


Sulfa (Sulfonamide Allergy  Hives Verified 10/15/18 10:06





Antibiotics)     











Medications: 


 Current Medications





Acetaminophen (Tylenol)  650 mg PO Q4H PRN


   PRN Reason: HA/ T > 101F; Mild Pain (1-3)


Hydrocodone Bitart/Acetaminophen (Norco 10/325)  1 tab PO Q4H PRN


   PRN Reason: Moderate Pain (4-6)


Hydrocodone Bitart/Acetaminophen (Norco 10/325)  2 tab PO Q4H PRN


   PRN Reason: Severe Pain (7-10)


Hydrocodone Bitart/Acetaminophen (Norco 7.5/325)  1 tab PO Q4H PRN


   PRN Reason: Mild Pain (1-3)


   Last Admin: 10/16/18 20:49 Dose:  1 tab


Hydrocodone Bitart/Acetaminophen (Norco 7.5/325)  2 tab PO Q4H PRN


   PRN Reason: Moderate Pain (4-6)


Allopurinol (Zyloprim)  100 mg PO DAILYPRN PRN


   PRN Reason: GOUT


Amlodipine/Benazepril HCl (Lotrel 5/20)  2 cap PO DAILY [facility]


Aspirin (Ecotrin)  81 mg PO BID [facility]


   Last Admin: 10/16/18 20:40 Dose:  81 mg


Atorvastatin Calcium (Lipitor)  20 mg PO HS [facility]


   Last Admin: 10/16/18 20:40 Dose:  20 mg


Bisoprolol Fumarate/HCTZ (Ziac 10-6.25)  1 tab PO DAILY [facility]


   Last Admin: 10/16/18 15:06 Dose:  Not Given


Cefazolin Sodium (Ancef)  2 gm SLOW IVP 0100,0900,1700 [facility]


   Stop: 10/17/18 01:01


   Last Admin: 10/16/18 17:21 Dose:  2 gm


Diphenhydramine HCl (Benadryl)  25 mg PO Q6H PRN


   PRN Reason: Itching


Doxazosin Mesylate (Cardura)  4 mg PO HS [facility]


   Last Admin: 10/16/18 20:40 Dose:  4 mg


Famotidine (Pepcid)  20 mg PO QAM [facility]


Fentanyl (Sublimaze)  50 mcg IV Q1H PRN


   PRN Reason:  BREAKTHROUGH PAIN


Ferrous Gluconate (Fergon)  324 mg PO BID [facility]


Fish Oil (Fish Oil)  1,000 mg PO DAILY [facility]


Sodium Chloride (Normal Saline 0.9%)  1,000 mls @ 100 mls/hr IV .Q10H [facility]


   Last Admin: 10/16/18 18:12 Dose:  Not Given


Ropivacaine 250 ml/ Device  250 mls @ 0 mls/hr NERVE BLCK INF [facility]


Iron/Minerals/Multivitamins (Theragran M)  1 tab PO DAILY [facility]


Ketorolac Tromethamine (Toradol)  15 mg IVP Q6H PRN


   PRN Reason: Moderate Pain (4-6)


   Stop: 10/19/18 10:15


Montelukast Sodium (Singulair)  10 mg PO DAILYPRN PRN


   PRN Reason: Allergies


Ondansetron HCl (Zofran)  4 mg IVP Q6H PRN


   PRN Reason: Nausea/Vomiting


Ondansetron HCl (Zofran)  4 mg IVP Q6H PRN


   PRN Reason: Nausea/Vomiting


Promethazine HCl (Phenergan)  12.5 mg IM Q4H PRN


   PRN Reason: Nausea/Vomiting


Promethazine HCl (Phenergan)  12.5 mg IM Q4H PRN


   PRN Reason: Nausea


Senna/Docusate Sodium (Senokot S)  2 tab PO BID [facility]


   Last Admin: 10/16/18 20:40 Dose:  2 tab


Sodium Chloride (Flush - Normal Saline)  10 ml IVF PRN PRN


   PRN Reason: Saline Flush


Tramadol HCl (Ultram)  100 mg PO Q6H PRN


   PRN Reason: Mild Pain (1-3)


Tramadol HCl (Ultram)  50 mg PO Q6H PRN


   PRN Reason: Mild Pain (1-3)


Tramadol HCl (Ultram)  100 mg PO Q6H PRN


   PRN Reason: Moderate Pain 4-6


Zolpidem Tartrate (Ambien)  5 mg PO HSPRN PRN


   PRN Reason: Insomnia


Zolpidem Tartrate (Ambien)  5 mg PO HSPRN PRN


   PRN Reason: Insomnia

## 2018-10-16 NOTE — RAD
TWO VIEWS RIGHT KNEE:

 

Date: 10-16-18

 

Provided Clinical History: 

Post op. 

 

FINDINGS: 

Post-operative changes of right total knee arthroplasty are demonstrated without evidence for fractur
e or other acute osseous abnormality. Post-operative soft tissue gas is seen. 

 

IMPRESSION: 

As above. 

 

POS: [facility]

## 2018-10-16 NOTE — EKG
Test Reason : 

Blood Pressure : ***/*** mmHG

Vent. Rate : 066 BPM     Atrial Rate : 066 BPM

   P-R Int : 202 ms          QRS Dur : 082 ms

    QT Int : 432 ms       P-R-T Axes : 076 006 029 degrees

   QTc Int : 452 ms

 

Normal sinus rhythm

Low voltage QRS

Borderline ECG

When compared with ECG of 19-SEP-2017 14:36,

No significant change was found

Confirmed by RONALDO CAMPBELL ([id number]) on 10/16/2018 9:37:25 AM

 

Referred By:  RAYSHAWN           Confirmed By:RONALDO CAMPBELL

## 2018-10-16 NOTE — OP
PREOPERATIVE DIAGNOSIS:  Degenerative joint disease, right knee.

 

POSTOPERATIVE DIAGNOSIS:  Degenerative joint disease, right knee.

 

SURGEON:  Hugo Kumar M.D.

 

ASSISTANT:  Burak Huynh PA-C.

 

BLOOD LOSS:  Minimal.

 

SPECIMEN:  None.

 

DRAINS:  None.

 

COMPLICATIONS:  None.

 

TITLE OF PROCEDURE:  Right total knee arthroplasty using a Meeta #3 Triathlon femur, 3 tibia, 9 mm 
CS X3 polyethylene, and A27 patella.

 

PROCEDURE IN DETAIL:  After informed consent was obtained in the preoperative holding area.  The garcia
ent was taken to the operative suite where general anesthesia was induced.  Once adequate level of ge
neral anesthesia was obtained, the patient was positioned and a well-padded tourniquet was placed mickey
und the right proximal thigh.  The right lower extremity was then prepped and draped in the usual jose
rile fashion.  Prior to exsanguination, a time out was called and all members of the surgical team ag
mc upon site, surgeon, and patient.  The extremity was then exsanguinated and the tourniquet was ra
ised.  A midline longitudinal incision was then made directly over the patella extending two fingerbr
eadths above the superior pole of the patella and two fingerbreadths inferior to the inferior patella
r pole of the patella.  Deeper subcutaneous layers were dissected sharply and local bleeding was cont
rolled with Bovie electrocautery.  A quad tendon longitudinal split was then made sharply and a media
n parapatellar arthrotomy was carried out both sharp and with Bovie electrocautery, carried down to o
ne fingerbreadth medial to the tibial tubercle.  The knee was then placed into flexion and the patell
a was everted nicely, and a copious fat pad ectomy was performed allowing for greater exposure of the
 tibia.  The computer-assisted distal femoral fiducial was then placed and pinned firmly, and the dis
coleman femoral cutting guide was pinned firmly into place.  The oscillating saw was then used to remove 
the appropriate amount of bone.  The 4-in-1 cutting block was then placed on the distal femur and the
 oscillating saw was used to remove the appropriate amount of bone off of the anterior, posterior, an
d chamfer cuts.  After completion of bone cuts, the anterior cruciate ligament was resected sharply a
nd the posterior cruciate ligament retractor was placed and the tibia was subluxed for better exposur
e.  Partial meniscectomies were carried out, and the tibial computer-assisted fiducial was pinned, an
d the cutting guide was placed.  Oscillating saw was then used to remove the bone with Hohmann retrac
tors used to take care and protect the collateral ligaments.  After the tibial resection was performe
d, a laminar  was placed in between the freshened bone cuts.  The knee placed at 90 degrees a
nd further bilateral meniscectomies were carried out, and the curved osteotome and curettage was used
 to remove any excess bone spurs in the posterior compartment.  The trial femoral component, tibial b
aseplate were placed with the appropriate polyethylene trial insert with an appropriate polyethylene 
spacer and patellar button.  The knee was taken through full range of motion with flexion and extensi
on from 0-90 degrees and patellar broach squarely in the trochlea without any squinting or subluxatio
n noted.  The knee was also stable to varus and valgus stressing at 0, 15, 45, and 90 degrees of flex
ion.  The drawer was negative.  All trial components were then removed and the keel punch was used to
 provide the appropriate defect in the tibia with a mallet.  The freshened bone cuts were copiously i
rrigated with pulsatile lavage of about 1-1/2 liters to remove all excess debris.  The freshened bone
 cuts were then dried and with suction and lap sponge.  The knee was placed in flexion and retractors
 were placed to provide access to all bone cuts.  Tobramycin impregnated methyl methacrylate cement w
as then placed on the freshened bone cuts and implants which were malleted firmly into place.  Curett
age and [place] elevators were used to remove any excess bone cement.  The knee was placed into full ex
tension and the patellar button was placed under compression, and the cement was allowed to cure.  On
ce completed, the components were again taken through full range of motion and copious irrigation of 
the knee was carried out with another liter of normal saline.  All components were inspected fully wi
th full range of motion and varus and valgus stressing.  There was no laxity noted and full extension
 was observed clinically.  Primary closure was accomplished with #2 interrupted Vicryl stitch of the 
arthrotomy defect.  This was oversewn with a #2 running Quill barbed stitch.  The gravitational plate
let system was then injected into the arthrotomy prior to closure.  The subcutaneous layer was then c
losed with a running 0 barbed Monocryl stitch and skin closure accomplished with a running subcuticul
ar 3-0 Monocryl barbed Quill stitch and augmented with cement on the skin.  Tourniquet was lowered.  
Good spontaneous return of distal pulses was noted clinically and a sterile dressing was applied to t
he incision.  The procedure was terminated without any complications.  The patient was awakened in th
e operative suite and the tourniquet was removed, and the patient was taken to the recovery room in s
table condition.

## 2018-10-17 LAB
HGB BLD-MCNC: 11.1 G/DL (ref 12–16)
MCH RBC QN AUTO: 30.5 PG (ref 27–31)
MCV RBC AUTO: 92.6 FL (ref 78–98)
PLATELET # BLD AUTO: 254 THOU/UL (ref 130–400)
RBC # BLD AUTO: 3.65 MILL/UL (ref 4.2–5.4)
WBC # BLD AUTO: 19.9 THOU/UL (ref 4.8–10.8)

## 2018-10-17 RX ADMIN — ASPIRIN SCH MG: 81 TABLET ORAL at 08:03

## 2018-10-17 RX ADMIN — Medication SCH GM: at 01:05

## 2018-10-17 RX ADMIN — MULTIPLE VITAMINS W/ MINERALS TAB SCH TAB: TAB at 08:02

## 2018-10-17 RX ADMIN — ASPIRIN SCH MG: 81 TABLET ORAL at 19:52

## 2018-10-17 RX ADMIN — HYDROCODONE BITARTRATE AND ACETAMINOPHEN PRN TAB: 7.5; 325 TABLET ORAL at 05:20

## 2018-10-17 RX ADMIN — DOCUSATE SODIUM 50 MG AND SENNOSIDES 8.6 MG SCH TAB: 8.6; 5 TABLET, FILM COATED ORAL at 19:53

## 2018-10-17 RX ADMIN — BISOPROLOL FUMARATE AND HYDROCHLOROTHIAZIDE SCH TAB: 6.25; 5 TABLET ORAL at 08:03

## 2018-10-17 RX ADMIN — AMLODIPINE BESYLATE AND BENAZEPRIL HYDROCHLORIDE SCH: 5; 20 CAPSULE ORAL at 09:00

## 2018-10-17 RX ADMIN — DOCUSATE SODIUM 50 MG AND SENNOSIDES 8.6 MG SCH TAB: 8.6; 5 TABLET, FILM COATED ORAL at 08:02

## 2018-10-17 RX ADMIN — HYDROCODONE BITARTRATE AND ACETAMINOPHEN PRN TAB: 7.5; 325 TABLET ORAL at 17:50

## 2018-10-17 NOTE — PDOC.PN
- Subjective


Encounter Start Date: 10/17/18


Encounter Start Time: 14:30





Patient seen and examined for med mngt. Some nausea earlier. BP on lower side 

per RN.No new complaints. No overnight events





- Objective


MAR Reviewed: Yes


Vital Signs & Weight: 


 Vital Signs (12 hours)











  Temp Pulse Resp BP BP Pulse Ox


 


 10/17/18 20:00  99 F  74  16  92/58 L   95


 


 10/17/18 19:52       95


 


 10/17/18 16:05  98.4 F  80  16   98/64  90 L








 Weight











Admit Weight                   200 lb


 


Weight                         200 lb














I&O: 


 











 10/16/18 10/17/18 10/18/18





 06:59 06:59 06:59


 


Intake Total  2500 


 


Output Total  1125 


 


Balance  1375 











Result Diagrams: 


 10/17/18 04:37








Phys Exam





- Physical Examination


Constitutional: NAD


Respiratory: no wheezing, no rhonchi


Cardiovascular: RRR, no rub


Gastrointestinal: soft, non-tender, positive bowel sounds


Musculoskeletal: no edema


Neurological: moves all 4 limbs





Dx/Plan





- Plan


DVT proph w/SCDs





IMPRESSION:


1. HTN - BP on lower side


2. Obesity BMI 39.1


3. Gout


4. CKD 2


5. Chronic leucocytosis





PLAN:


Cont current dose of Ziac and Lotrel with holding parameters


Cont other meds as below 








Review of Systems





- Review of Systems


Cardiovascular: negative: chest pain, palpitations, orthopnea, paroxysmal 

nocturnal dyspnea, edema, light headedness, other


Gastrointestinal: negative: Nausea, Vomiting, Abdominal Pain, Diarrhea, 

Constipation, Melena, Hematochezia, Other





- Medications/Allergies


Allergies/Adverse Reactions: 


 Allergies











Allergy/AdvReac Type Severity Reaction Status Date / Time


 


Sulfa (Sulfonamide Allergy  Hives Verified 10/15/18 10:06





Antibiotics)     











Medications: 


 Current Medications





Acetaminophen (Tylenol)  650 mg PO Q4H PRN


   PRN Reason: HA/ T > 101F; Mild Pain (1-3)


Hydrocodone Bitart/Acetaminophen (Norco 7.5/325)  1 tab PO Q4H PRN


   PRN Reason: Mild Pain (1-3)


   Last Admin: 10/17/18 05:20 Dose:  1 tab


Hydrocodone Bitart/Acetaminophen (Norco 7.5/325)  2 tab PO Q4H PRN


   PRN Reason: Moderate Pain (4-6)


   Last Admin: 10/17/18 17:50 Dose:  2 tab


Allopurinol (Zyloprim)  100 mg PO DAILYPRN PRN


   PRN Reason: GOUT


Amlodipine/Benazepril HCl (Lotrel 5/20)  1 cap PO DAILY [facility]


   Last Admin: 10/17/18 09:00 Dose:  Not Given


Aspirin (Ecotrin)  81 mg PO BID [facility]


   Last Admin: 10/17/18 19:52 Dose:  81 mg


Atorvastatin Calcium (Lipitor)  20 mg PO HS [facility]


   Last Admin: 10/17/18 19:53 Dose:  20 mg


Bisoprolol Fumarate/HCTZ (Ziac 5-6.25)  1 tab PO DAILY [facility]


   Last Admin: 10/17/18 08:03 Dose:  1 tab


Diphenhydramine HCl (Benadryl)  25 mg PO Q6H PRN


   PRN Reason: Itching


Doxazosin Mesylate (Cardura)  4 mg PO HS [facility]


   Last Admin: 10/17/18 19:53 Dose:  Not Given


Famotidine (Pepcid)  20 mg PO QAM [facility]


   Last Admin: 10/17/18 08:02 Dose:  20 mg


Fentanyl (Sublimaze)  50 mcg IV Q1H PRN


   PRN Reason:  BREAKTHROUGH PAIN


Ferrous Gluconate (Fergon)  324 mg PO BID [facility]


   Last Admin: 10/17/18 19:53 Dose:  324 mg


Fish Oil (Fish Oil)  1,000 mg PO DAILY [facility]


   Last Admin: 10/17/18 08:02 Dose:  1,000 mg


Sodium Chloride (Normal Saline 0.9%)  1,000 mls @ 100 mls/hr IV .Q10H [facility]


   Last Admin: 10/17/18 17:10 Dose:  Not Given


Ropivacaine 250 ml/ Device  250 mls @ 0 mls/hr NERVE BLCK INF [facility]


   Last Admin: 10/17/18 14:21 Dose:  250 mls


Iron/Minerals/Multivitamins (Theragran M)  1 tab PO DAILY [facility]


   Last Admin: 10/17/18 08:02 Dose:  1 tab


Ketorolac Tromethamine (Toradol)  15 mg IVP Q6H PRN


   PRN Reason: Moderate Pain (4-6)


   Stop: 10/19/18 10:15


Montelukast Sodium (Singulair)  10 mg PO DAILYPRN PRN


   PRN Reason: Allergies


Ondansetron HCl (Zofran)  4 mg IVP Q6H PRN


   PRN Reason: Nausea/Vomiting


   Last Admin: 10/17/18 06:56 Dose:  4 mg


Promethazine HCl (Phenergan)  12.5 mg IM Q4H PRN


   PRN Reason: Nausea


Senna/Docusate Sodium (Senokot S)  2 tab PO BID [facility]


   Last Admin: 10/17/18 19:53 Dose:  2 tab


Sodium Chloride (Flush - Normal Saline)  10 ml IVF PRN PRN


   PRN Reason: Saline Flush


Tramadol HCl (Ultram)  50 mg PO Q6H PRN


   PRN Reason: Mild Pain (1-3)


Tramadol HCl (Ultram)  100 mg PO Q6H PRN


   PRN Reason: Moderate Pain 4-6


Zolpidem Tartrate (Ambien)  5 mg PO HSPRN PRN


   PRN Reason: Insomnia

## 2018-10-18 LAB
ANION GAP SERPL CALC-SCNC: 11 MMOL/L (ref 10–20)
BUN SERPL-MCNC: 20 MG/DL (ref 9.8–20.1)
CALCIUM SERPL-MCNC: 8.2 MG/DL (ref 7.8–10.44)
CHLORIDE SERPL-SCNC: 103 MMOL/L (ref 98–107)
CO2 SERPL-SCNC: 28 MMOL/L (ref 23–31)
CREAT CL PREDICTED SERPL C-G-VRATE: 102 ML/MIN (ref 70–130)
GLUCOSE SERPL-MCNC: 161 MG/DL (ref 83–110)
HGB BLD-MCNC: 10.3 G/DL (ref 12–16)
MAGNESIUM SERPL-MCNC: 1.8 MG/DL (ref 1.6–2.6)
MCH RBC QN AUTO: 29.8 PG (ref 27–31)
MCV RBC AUTO: 94.1 FL (ref 78–98)
PLATELET # BLD AUTO: 230 THOU/UL (ref 130–400)
POTASSIUM SERPL-SCNC: 4.3 MMOL/L (ref 3.5–5.1)
RBC # BLD AUTO: 3.45 MILL/UL (ref 4.2–5.4)
SODIUM SERPL-SCNC: 138 MMOL/L (ref 136–145)
TROPONIN I SERPL DL<=0.01 NG/ML-MCNC: 0.07 NG/ML (ref ?–0.03)
TROPONIN I SERPL DL<=0.01 NG/ML-MCNC: 0.16 NG/ML (ref ?–0.03)
WBC # BLD AUTO: 16.8 THOU/UL (ref 4.8–10.8)

## 2018-10-18 RX ADMIN — HYDROCODONE BITARTRATE AND ACETAMINOPHEN PRN TAB: 7.5; 325 TABLET ORAL at 13:01

## 2018-10-18 RX ADMIN — HYDROCODONE BITARTRATE AND ACETAMINOPHEN PRN TAB: 7.5; 325 TABLET ORAL at 17:28

## 2018-10-18 RX ADMIN — DOCUSATE SODIUM 50 MG AND SENNOSIDES 8.6 MG SCH TAB: 8.6; 5 TABLET, FILM COATED ORAL at 22:18

## 2018-10-18 RX ADMIN — HYDROCODONE BITARTRATE AND ACETAMINOPHEN PRN TAB: 7.5; 325 TABLET ORAL at 00:46

## 2018-10-18 RX ADMIN — ASPIRIN SCH MG: 81 TABLET ORAL at 22:18

## 2018-10-18 RX ADMIN — BISOPROLOL FUMARATE AND HYDROCHLOROTHIAZIDE SCH: 6.25; 5 TABLET ORAL at 08:43

## 2018-10-18 RX ADMIN — DOCUSATE SODIUM 50 MG AND SENNOSIDES 8.6 MG SCH TAB: 8.6; 5 TABLET, FILM COATED ORAL at 08:38

## 2018-10-18 RX ADMIN — HYDROCODONE BITARTRATE AND ACETAMINOPHEN PRN TAB: 7.5; 325 TABLET ORAL at 22:22

## 2018-10-18 RX ADMIN — MULTIPLE VITAMINS W/ MINERALS TAB SCH TAB: TAB at 08:38

## 2018-10-18 RX ADMIN — ASPIRIN SCH MG: 81 TABLET ORAL at 08:38

## 2018-10-18 RX ADMIN — HYDROCODONE BITARTRATE AND ACETAMINOPHEN PRN TAB: 7.5; 325 TABLET ORAL at 05:17

## 2018-10-18 RX ADMIN — AMLODIPINE BESYLATE AND BENAZEPRIL HYDROCHLORIDE SCH: 5; 20 CAPSULE ORAL at 08:43

## 2018-10-18 NOTE — RAD
CHEST ONE VIEW:

10/18/18

 

HISTORY: 

Dyspnea. 

 

COMPARISON:  

10/15/18.

 

FINDINGS:  

The cardiac silhouette is magnified and partially obscured by increasing patchy bibasilar infiltrates
. Pulmonary vasculature is engorged with patchy bilateral perihilar infiltrates. Mediastinum is midli
ne with aortic calcification. No evidence of pneumothorax. 

 

IMPRESSION:  

Developing pulmonary edema. 

 

Atherosclerosis. 

 

POS: [facility]

## 2018-10-18 NOTE — PDOC.PN
- Subjective


Encounter Start Date: 10/18/18


Encounter Start Time: 20:30





Patient seen and examined for resp distress. BP on lower side earlier - IVF was 

restarted. No overnight events





- Objective


MAR Reviewed: Yes


Vital Signs & Weight: 


 Vital Signs (12 hours)











  Temp Pulse Resp BP BP Pulse Ox


 


 10/18/18 20:19  98.9 F  98  20   194/68 H  92 L


 


 10/18/18 19:15   92  16    89 L


 


 10/18/18 15:45  98 F  85  18  105/65   92 L


 


 10/18/18 11:13  98.3 F  73  18  109/68   96








 Weight











Admit Weight                   200 lb


 


Weight                         200 lb














I&O: 


 











 10/17/18 10/18/18 10/19/18





 06:59 06:59 06:59


 


Intake Total 2500 500 


 


Output Total 1125  


 


Balance 1375 500 











Result Diagrams: 


 10/18/18 04:07





 10/18/18 19:10





Phys Exam





- Physical Examination


Constitutional: NAD


Respiratory: no wheezing


Bibasilar rales with scat rhonchi


Cardiovascular: RRR, no rub


Gastrointestinal: soft, positive bowel sounds


Musculoskeletal: no edema


Neurological: moves all 4 limbs





Dx/Plan





- Plan


DVT proph w/SCDs





IMPRESSION:


1. Resp distress due to volume overload


2. HTN - home BP meds on hold


3. Gout


4. CKD 2


5. Chronic leucocytosis/Obesity BMI 39.1





PLAN:


IV Lasix 40 mg x 1


Pham


DC IVF


Cont current dose of Ziac and Lotrel with holding parameters


Cont other meds as below 








Review of Systems





- Review of Systems


Cardiovascular: negative: chest pain, palpitations, orthopnea, paroxysmal 

nocturnal dyspnea, edema, light headedness, other


Gastrointestinal: negative: Nausea, Vomiting, Abdominal Pain, Diarrhea, 

Constipation, Melena, Hematochezia, Other





- Medications/Allergies


Allergies/Adverse Reactions: 


 Allergies











Allergy/AdvReac Type Severity Reaction Status Date / Time


 


Sulfa (Sulfonamide Allergy  Hives Verified 10/15/18 10:06





Antibiotics)     











Medications: 


 Current Medications





Acetaminophen (Tylenol)  650 mg PO Q4H PRN


   PRN Reason: HA/ T > 101F; Mild Pain (1-3)


Hydrocodone Bitart/Acetaminophen (Norco 7.5/325)  1 tab PO Q4H PRN


   PRN Reason: Mild Pain (1-3)


   Last Admin: 10/18/18 17:28 Dose:  1 tab


Hydrocodone Bitart/Acetaminophen (Norco 7.5/325)  2 tab PO Q4H PRN


   PRN Reason: Moderate Pain (4-6)


   Last Admin: 10/18/18 00:46 Dose:  2 tab


Albuterol/Ipratropium (Duoneb)  3 ml NEB [id number] [facility]


   Last Admin: 10/18/18 19:15 Dose:  3 ml


Allopurinol (Zyloprim)  100 mg PO DAILYPRN PRN


   PRN Reason: GOUT


Amlodipine/Benazepril HCl (Lotrel 5/20)  1 cap PO DAILY [facility]


   Last Admin: 10/18/18 08:43 Dose:  Not Given


Aspirin (Ecotrin)  81 mg PO BID [facility]


   Last Admin: 10/18/18 08:38 Dose:  81 mg


Atorvastatin Calcium (Lipitor)  20 mg PO HS [facility]


   Last Admin: 10/17/18 19:53 Dose:  20 mg


Bisoprolol Fumarate/HCTZ (Ziac 5-6.25)  1 tab PO DAILY [facility]


   Last Admin: 10/18/18 08:43 Dose:  Not Given


Diphenhydramine HCl (Benadryl)  25 mg PO Q6H PRN


   PRN Reason: Itching


Doxazosin Mesylate (Cardura)  4 mg PO HS [facility]


Famotidine (Pepcid)  20 mg PO QAM [facility]


   Last Admin: 10/18/18 08:38 Dose:  20 mg


Fentanyl (Sublimaze)  50 mcg IV Q1H PRN


   PRN Reason:  BREAKTHROUGH PAIN


Ferrous Gluconate (Fergon)  324 mg PO BID [facility]


   Last Admin: 10/18/18 08:39 Dose:  324 mg


Fish Oil (Fish Oil)  1,000 mg PO DAILY [facility]


   Last Admin: 10/18/18 08:38 Dose:  1,000 mg


Furosemide (Lasix)  40 mg SLOW IVP ONE [facility]


Ropivacaine 250 ml/ Device  250 mls @ 0 mls/hr NERVE BLCK INF [facility]


   Last Admin: 10/17/18 14:21 Dose:  250 mls


Iron/Minerals/Multivitamins (Theragran M)  1 tab PO DAILY [facility]


   Last Admin: 10/18/18 08:38 Dose:  1 tab


Ketorolac Tromethamine (Toradol)  15 mg IVP Q6H PRN


   PRN Reason: Moderate Pain (4-6)


   Stop: 10/19/18 10:15


   Last Admin: 10/18/18 05:18 Dose:  15 mg


Montelukast Sodium (Singulair)  10 mg PO DAILYPRN PRN


   PRN Reason: Allergies


Nitroglycerin (Nitro-Bid 2% Ointment)  0.5 inch TOP Q8HR PRN


   PRN Reason: SBP Greater Than 180


Ondansetron HCl (Zofran)  4 mg IVP Q6H PRN


   PRN Reason: Nausea/Vomiting


   Last Admin: 10/17/18 06:56 Dose:  4 mg


Promethazine HCl (Phenergan)  12.5 mg IM Q4H PRN


   PRN Reason: Nausea


Senna/Docusate Sodium (Senokot S)  2 tab PO BID [facility]


   Last Admin: 10/18/18 08:38 Dose:  2 tab


Sodium Chloride (Flush - Normal Saline)  10 ml IVF PRN PRN


   PRN Reason: Saline Flush


Tramadol HCl (Ultram)  50 mg PO Q6H PRN


   PRN Reason: Mild Pain (1-3)


Tramadol HCl (Ultram)  100 mg PO Q6H PRN


   PRN Reason: Moderate Pain 4-6


Zolpidem Tartrate (Ambien)  5 mg PO HSPRN PRN


   PRN Reason: Insomnia

## 2018-10-19 LAB
ANION GAP SERPL CALC-SCNC: 13 MMOL/L (ref 10–20)
BASOPHILS # BLD AUTO: 0 THOU/UL (ref 0–0.2)
BASOPHILS NFR BLD AUTO: 0.1 % (ref 0–1)
BUN SERPL-MCNC: 14 MG/DL (ref 9.8–20.1)
CALCIUM SERPL-MCNC: 8.1 MG/DL (ref 7.8–10.44)
CHLORIDE SERPL-SCNC: 98 MMOL/L (ref 98–107)
CO2 SERPL-SCNC: 29 MMOL/L (ref 23–31)
CREAT CL PREDICTED SERPL C-G-VRATE: 98 ML/MIN (ref 70–130)
EOSINOPHIL # BLD AUTO: 0.2 THOU/UL (ref 0–0.7)
EOSINOPHIL NFR BLD AUTO: 1.2 % (ref 0–10)
GLUCOSE SERPL-MCNC: 145 MG/DL (ref 83–110)
HGB BLD-MCNC: 9.9 G/DL (ref 12–16)
LYMPHOCYTES # BLD: 2.9 THOU/UL (ref 1.2–3.4)
LYMPHOCYTES NFR BLD AUTO: 16.3 % (ref 21–51)
MAGNESIUM SERPL-MCNC: 2 MG/DL (ref 1.6–2.6)
MCH RBC QN AUTO: 30.3 PG (ref 27–31)
MCV RBC AUTO: 92.4 FL (ref 78–98)
MONOCYTES # BLD AUTO: 1.4 THOU/UL (ref 0.11–0.59)
MONOCYTES NFR BLD AUTO: 7.7 % (ref 0–10)
NEUTROPHILS # BLD AUTO: 13.2 THOU/UL (ref 1.4–6.5)
NEUTROPHILS NFR BLD AUTO: 74.7 % (ref 42–75)
PLATELET # BLD AUTO: 229 THOU/UL (ref 130–400)
POTASSIUM SERPL-SCNC: 3.9 MMOL/L (ref 3.5–5.1)
RBC # BLD AUTO: 3.26 MILL/UL (ref 4.2–5.4)
SODIUM SERPL-SCNC: 136 MMOL/L (ref 136–145)
TROPONIN I SERPL DL<=0.01 NG/ML-MCNC: 0.14 NG/ML (ref ?–0.03)
WBC # BLD AUTO: 17.7 THOU/UL (ref 4.8–10.8)

## 2018-10-19 RX ADMIN — MULTIPLE VITAMINS W/ MINERALS TAB SCH TAB: TAB at 10:44

## 2018-10-19 RX ADMIN — AMLODIPINE BESYLATE AND BENAZEPRIL HYDROCHLORIDE SCH CAP: 5; 20 CAPSULE ORAL at 10:44

## 2018-10-19 RX ADMIN — BISOPROLOL FUMARATE AND HYDROCHLOROTHIAZIDE SCH TAB: 6.25; 5 TABLET ORAL at 08:42

## 2018-10-19 RX ADMIN — HYDROCODONE BITARTRATE AND ACETAMINOPHEN PRN TAB: 7.5; 325 TABLET ORAL at 10:43

## 2018-10-19 RX ADMIN — HYDROCODONE BITARTRATE AND ACETAMINOPHEN PRN TAB: 7.5; 325 TABLET ORAL at 20:36

## 2018-10-19 RX ADMIN — ASPIRIN SCH MG: 81 TABLET ORAL at 20:37

## 2018-10-19 RX ADMIN — DOCUSATE SODIUM 50 MG AND SENNOSIDES 8.6 MG SCH TAB: 8.6; 5 TABLET, FILM COATED ORAL at 08:41

## 2018-10-19 RX ADMIN — ASPIRIN SCH MG: 81 TABLET ORAL at 08:41

## 2018-10-19 RX ADMIN — DOCUSATE SODIUM 50 MG AND SENNOSIDES 8.6 MG SCH TAB: 8.6; 5 TABLET, FILM COATED ORAL at 20:36

## 2018-10-19 NOTE — RAD
PORTABLE AP CHEST X-RAY:

 

10/19/2018

 

HISTORY:

Shortness of breath.

 

COMPARISON:

10/18/2018

 

FINDINGS:

There is increased bilateral interstitial and alveolar opacities, greater at the lung bases, which ma
y be related to either pulmonary edema or possibly an infectious process.  An atypical infection proc
ess cannot be excluded.  Given the differences in technique, the chest is overall similar to the prio
r exam.  The cardiac silhouette is stable in size, and there is mild pulmonary vascular congestion.  
No other interval change.

 

IMPRESSION:

Stable chest.

 

POS: [facility]

## 2018-10-19 NOTE — PDOC.PN
- Subjective


Encounter Start Date: 10/19/18


Encounter Start Time: 08:30





Patient seen and examined for med mngt. Feeling better. No new complaints. No 

overnight events





- Objective


MAR Reviewed: Yes


Vital Signs & Weight: 


 Vital Signs (12 hours)











  Temp Pulse Resp BP Pulse Ox


 


 10/19/18 22:36   66  20  


 


 10/19/18 19:51  98 F  78  20  112/66  94 L


 


 10/19/18 18:07   78  20   98


 


 10/19/18 16:08  97.9 F  76  18  140/68  95


 


 10/19/18 14:28   77  20   94 L








 Weight











Admit Weight                   200 lb


 


Weight                         200 lb














I&O: 


 











 10/18/18 10/19/18 10/20/18





 06:59 06:59 06:59


 


Intake Total 500 600 240


 


Output Total  1850 1550


 


Balance 500 -1250 -1310











Result Diagrams: 


 10/19/18 07:33





 10/20/18 04:37


Additional Labs: 





 Laboratory Tests











  10/18/18 10/18/18 10/19/18





  19:10 22:11 07:56


 


Troponin I  0.067 H  0.162 H  0.138 H











EKG Reviewed by me: Yes (SR)





Phys Exam





- Physical Examination


Constitutional: NAD


Respiratory: no wheezing, no rhonchi


Some bibasilar rales


Cardiovascular: RRR, no rub


Gastrointestinal: soft, positive bowel sounds


Neurological: moves all 4 limbs





Dx/Plan





- Plan


DVT proph w/SCDs





IMPRESSION:


1. Resp distress due to volume overload - improving


2. HTN 


3. Gout


4. CKD 2


5. Elevated troponins due to volume overload/demand ischemia/Chronic 

leucocytosis/Obesity BMI 39.1





PLAN:


Cont IV Lasix


Cont home BP meds


Cont current dose of Ziac and Lotrel with holding parameters


Cont other meds as below 











Review of Systems





- Review of Systems


Cardiovascular: orthopnea.  negative: chest pain, palpitations, paroxysmal 

nocturnal dyspnea, edema, light headedness, other


Gastrointestinal: negative: Nausea, Vomiting, Abdominal Pain, Diarrhea, 

Constipation, Melena, Hematochezia, Other





- Medications/Allergies


Allergies/Adverse Reactions: 


 Allergies











Allergy/AdvReac Type Severity Reaction Status Date / Time


 


Sulfa (Sulfonamide Allergy  Hives Verified 10/15/18 10:06





Antibiotics)     











Medications: 


 Current Medications





Acetaminophen (Tylenol)  650 mg PO Q4H PRN


   PRN Reason: HA/ T > 101F; Mild Pain (1-3)


Hydrocodone Bitart/Acetaminophen (Norco 7.5/325)  1 tab PO Q4H PRN


   PRN Reason: Mild Pain (1-3)


   Last Admin: 10/19/18 10:43 Dose:  1 tab


Hydrocodone Bitart/Acetaminophen (Norco 7.5/325)  2 tab PO Q4H PRN


   PRN Reason: Moderate Pain (4-6)


   Last Admin: 10/19/18 20:36 Dose:  2 tab


Albuterol/Ipratropium (Duoneb)  3 ml NEB [id number] [facility]


   Last Admin: 10/19/18 22:36 Dose:  3 ml


Allopurinol (Zyloprim)  100 mg PO DAILYPRN PRN


   PRN Reason: GOUT


Amlodipine/Benazepril HCl (Lotrel 5/20)  1 cap PO DAILY [facility]


   Last Admin: 10/19/18 10:44 Dose:  1 cap


Aspirin (Ecotrin)  81 mg PO BID [facility]


   Last Admin: 10/19/18 20:37 Dose:  81 mg


Atorvastatin Calcium (Lipitor)  20 mg PO HS [facility]


   Last Admin: 10/19/18 20:36 Dose:  20 mg


Bisoprolol Fumarate/HCTZ (Ziac 5-6.25)  1 tab PO DAILY [facility]


   Last Admin: 10/19/18 08:42 Dose:  1 tab


Diphenhydramine HCl (Benadryl)  25 mg PO Q6H PRN


   PRN Reason: Itching


Doxazosin Mesylate (Cardura)  4 mg PO HS [facility]


   Last Admin: 10/19/18 20:37 Dose:  4 mg


Famotidine (Pepcid)  20 mg PO QAM [facility]


   Last Admin: 10/19/18 08:41 Dose:  20 mg


Fentanyl (Sublimaze)  50 mcg IV Q1H PRN


   PRN Reason:  BREAKTHROUGH PAIN


Ferrous Gluconate (Fergon)  324 mg PO BID [facility]


   Last Admin: 10/19/18 20:37 Dose:  324 mg


Fish Oil (Fish Oil)  1,000 mg PO DAILY [facility]


   Last Admin: 10/19/18 08:41 Dose:  1,000 mg


Furosemide (Lasix)  20 mg SLOW IVP 0600,1400 [facility]


   Last Admin: 10/19/18 14:38 Dose:  20 mg


Ropivacaine 250 ml/ Device  250 mls @ 0 mls/hr NERVE BLCK INF [facility]


   Last Admin: 10/17/18 14:21 Dose:  250 mls


Iron/Minerals/Multivitamins (Theragran M)  1 tab PO DAILY [facility]


   Last Admin: 10/19/18 10:44 Dose:  1 tab


Montelukast Sodium (Singulair)  10 mg PO DAILYPRN PRN


   PRN Reason: Allergies


Nitroglycerin (Nitro-Bid 2% Ointment)  0.5 inch TOP Q8HR PRN


   PRN Reason: SBP Greater Than 180


Ondansetron HCl (Zofran)  4 mg IVP Q6H PRN


   PRN Reason: Nausea/Vomiting


   Last Admin: 10/17/18 06:56 Dose:  4 mg


Promethazine HCl (Phenergan)  12.5 mg IM Q4H PRN


   PRN Reason: Nausea


Senna/Docusate Sodium (Senokot S)  2 tab PO BID [facility]


   Last Admin: 10/19/18 20:36 Dose:  2 tab


Sodium Chloride (Flush - Normal Saline)  10 ml IVF PRN PRN


   PRN Reason: Saline Flush


Tramadol HCl (Ultram)  50 mg PO Q6H PRN


   PRN Reason: Mild Pain (1-3)


Tramadol HCl (Ultram)  100 mg PO Q6H PRN


   PRN Reason: Moderate Pain 4-6


   Last Admin: 10/19/18 14:45 Dose:  100 mg


Zolpidem Tartrate (Ambien)  5 mg PO HSPRN PRN


   PRN Reason: Insomnia

## 2018-10-20 LAB
ANION GAP SERPL CALC-SCNC: 9 MMOL/L (ref 10–20)
BUN SERPL-MCNC: 18 MG/DL (ref 9.8–20.1)
CALCIUM SERPL-MCNC: 8.3 MG/DL (ref 7.8–10.44)
CHLORIDE SERPL-SCNC: 94 MMOL/L (ref 98–107)
CO2 SERPL-SCNC: 37 MMOL/L (ref 23–31)
CREAT CL PREDICTED SERPL C-G-VRATE: 94 ML/MIN (ref 70–130)
GLUCOSE SERPL-MCNC: 110 MG/DL (ref 83–110)
MAGNESIUM SERPL-MCNC: 2 MG/DL (ref 1.6–2.6)
POTASSIUM SERPL-SCNC: 3.3 MMOL/L (ref 3.5–5.1)
SODIUM SERPL-SCNC: 137 MMOL/L (ref 136–145)

## 2018-10-20 RX ADMIN — HYDROCODONE BITARTRATE AND ACETAMINOPHEN PRN TAB: 7.5; 325 TABLET ORAL at 01:15

## 2018-10-20 RX ADMIN — ASPIRIN SCH MG: 81 TABLET ORAL at 21:07

## 2018-10-20 RX ADMIN — ASPIRIN SCH MG: 81 TABLET ORAL at 09:01

## 2018-10-20 RX ADMIN — DOCUSATE SODIUM 50 MG AND SENNOSIDES 8.6 MG SCH TAB: 8.6; 5 TABLET, FILM COATED ORAL at 21:06

## 2018-10-20 RX ADMIN — MULTIPLE VITAMINS W/ MINERALS TAB SCH TAB: TAB at 09:01

## 2018-10-20 RX ADMIN — HYDROCODONE BITARTRATE AND ACETAMINOPHEN PRN TAB: 7.5; 325 TABLET ORAL at 11:14

## 2018-10-20 RX ADMIN — DOCUSATE SODIUM 50 MG AND SENNOSIDES 8.6 MG SCH TAB: 8.6; 5 TABLET, FILM COATED ORAL at 09:01

## 2018-10-20 RX ADMIN — BISOPROLOL FUMARATE AND HYDROCHLOROTHIAZIDE SCH TAB: 6.25; 5 TABLET ORAL at 09:02

## 2018-10-20 NOTE — EKG
Test Reason : STAT

Blood Pressure : ***/*** mmHG

Vent. Rate : 093 BPM     Atrial Rate : 093 BPM

   P-R Int : 184 ms          QRS Dur : 088 ms

    QT Int : 360 ms       P-R-T Axes : 064 025 008 degrees

   QTc Int : 447 ms

 

Sinus rhythm with

ST abnormality, possible digitalis effect

Abnormal ECG

When compared with ECG of 15-OCT-2018 10:38,

No significant change was found

Confirmed by RONALDO CAMPBELL ([id number]) on 10/20/2018 8:39:36 AM

 

Referred By:  [email or web address]           Confirmed By:RONALDO CAMPBELL

## 2018-10-20 NOTE — PDOC.PN
- Subjective


Encounter Start Date: 10/20/18


Encounter Start Time: 09:00





Patient seen and examined for med mngt. SOB improving. No new complaints. No 

overnight events





- Objective


MAR Reviewed: Yes


Vital Signs & Weight: 


 Vital Signs (12 hours)











  Temp Pulse Resp BP BP BP Pulse Ox


 


 10/20/18 16:06  99.3 F  86  18   99/47 L   93 L


 


 10/20/18 11:19  97.7 F  82  18   118/76   95


 


 10/20/18 11:16   70  16     94 L


 


 10/20/18 10:07       


 


 10/20/18 09:02   98   127/72   


 


 10/20/18 08:59  98.3 F  98  18    127/72  94 L














  Pulse Ox


 


 10/20/18 16:06 


 


 10/20/18 11:19 


 


 10/20/18 11:16 


 


 10/20/18 10:07  88 L


 


 10/20/18 09:02 


 


 10/20/18 08:59 








 Weight











Admit Weight                   200 lb


 


Weight                         200 lb














I&O: 


 











 10/19/18 10/20/18 10/21/18





 06:59 06:59 06:59


 


Intake Total 600 960 600


 


Output Total 1850 2350 850


 


Balance -1250 -1390 -250











Result Diagrams: 


 10/19/18 07:33





 10/21/18 05:26





Phys Exam





- Physical Examination


Constitutional: NAD


Respiratory: no wheezing, no rhonchi


few bibasilar rales


Cardiovascular: RRR, no rub


Gastrointestinal: soft, non-tender, positive bowel sounds


Musculoskeletal: no edema





Dx/Plan





- Plan


DVT proph w/SCDs





IMPRESSION:


1. Resp distress due to volume overload - improving/ Suspected diastolic HF


2. HTN 


3. Gout


4. CKD 2


5. Hypokalemia


6. Elevated troponins due to volume overload/demand ischemia/Chronic 

leucocytosis/Obesity BMI 39.1





PLAN:


Change Lasix to PO


DC Pham in AM


Wean O2


Replace Potassium


Change Nebs to PRN


AM labs


Cont other meds as below 











Review of Systems





- Review of Systems


Constitutional: negative: fever, chills, sweats, weakness, malaise, other


Cardiovascular: negative: chest pain, palpitations, orthopnea, paroxysmal 

nocturnal dyspnea, edema, light headedness, other





- Medications/Allergies


Allergies/Adverse Reactions: 


 Allergies











Allergy/AdvReac Type Severity Reaction Status Date / Time


 


Sulfa (Sulfonamide Allergy  Hives Verified 10/15/18 10:06





Antibiotics)     











Medications: 


 Current Medications





Acetaminophen (Tylenol)  650 mg PO Q4H PRN


   PRN Reason: HA/ T > 101F; Mild Pain (1-3)


Hydrocodone Bitart/Acetaminophen (Norco 7.5/325)  1 tab PO Q4H PRN


   PRN Reason: Mild Pain (1-3)


   Last Admin: 10/19/18 10:43 Dose:  1 tab


Hydrocodone Bitart/Acetaminophen (Norco 7.5/325)  2 tab PO Q4H PRN


   PRN Reason: Moderate Pain (4-6)


   Last Admin: 10/20/18 11:14 Dose:  2 tab


Albuterol/Ipratropium (Duoneb)  3 ml NEB [id number] PRN


   PRN Reason: SOB &/or Wheezing


Allopurinol (Zyloprim)  100 mg PO DAILYPRN PRN


   PRN Reason: GOUT


Amlodipine Besylate (Norvasc)  5 mg PO DAILY [facility]


   Last Admin: 10/20/18 09:02 Dose:  5 mg


Aspirin (Ecotrin)  81 mg PO BID [facility]


   Last Admin: 10/20/18 09:01 Dose:  81 mg


Atorvastatin Calcium (Lipitor)  20 mg PO HS [facility]


   Last Admin: 10/19/18 20:36 Dose:  20 mg


Benazepril HCl (Lotensin)  20 mg PO DAILY [facility]


   Last Admin: 10/20/18 09:02 Dose:  20 mg


Bisoprolol Fumarate/HCTZ (Ziac 5-6.25)  1 tab PO DAILY [facility]


   Last Admin: 10/20/18 09:02 Dose:  1 tab


Diphenhydramine HCl (Benadryl)  25 mg PO Q6H PRN


   PRN Reason: Itching


Doxazosin Mesylate (Cardura)  4 mg PO HS [facility]


   Last Admin: 10/19/18 20:37 Dose:  4 mg


Famotidine (Pepcid)  20 mg PO QAM [facility]


   Last Admin: 10/20/18 09:02 Dose:  20 mg


Fentanyl (Sublimaze)  50 mcg IV Q1H PRN


   PRN Reason:  BREAKTHROUGH PAIN


Ferrous Gluconate (Fergon)  324 mg PO BID [facility]


   Last Admin: 10/20/18 09:03 Dose:  324 mg


Fish Oil (Fish Oil)  1,000 mg PO DAILY [facility]


   Last Admin: 10/20/18 09:02 Dose:  1,000 mg


Furosemide (Lasix)  20 mg PO DAILY [facility]


Ropivacaine 250 ml/ Device  250 mls @ 0 mls/hr NERVE BLCK INF [facility]


   Last Admin: 10/17/18 14:21 Dose:  250 mls


Iron/Minerals/Multivitamins (Theragran M)  1 tab PO DAILY [facility]


   Last Admin: 10/20/18 09:01 Dose:  1 tab


Montelukast Sodium (Singulair)  10 mg PO DAILYPRN PRN


   PRN Reason: Allergies


Nitroglycerin (Nitro-Bid 2% Ointment)  0.5 inch TOP Q8HR PRN


   PRN Reason: SBP Greater Than 180


Ondansetron HCl (Zofran)  4 mg IVP Q6H PRN


   PRN Reason: Nausea/Vomiting


   Last Admin: 10/20/18 15:30 Dose:  4 mg


Polyethylene Glycol (Miralax)  17 gm PO DAILY [facility]


Potassium Chloride (K-Dur)  20 meq PO BID-[facility]


   Last Admin: 10/20/18 16:44 Dose:  20 meq


Promethazine HCl (Phenergan)  12.5 mg IM Q4H PRN


   PRN Reason: Nausea


Senna/Docusate Sodium (Senokot S)  2 tab PO BID [facility]


   Last Admin: 10/20/18 09:01 Dose:  2 tab


Sodium Chloride (Flush - Normal Saline)  10 ml IVF PRN PRN


   PRN Reason: Saline Flush


Tramadol HCl (Ultram)  50 mg PO Q6H PRN


   PRN Reason: Mild Pain (1-3)


Tramadol HCl (Ultram)  100 mg PO Q6H PRN


   PRN Reason: Moderate Pain 4-6


   Last Admin: 10/20/18 19:15 Dose:  100 mg


Zolpidem Tartrate (Ambien)  5 mg PO HSPRN PRN


   PRN Reason: Insomnia

## 2018-10-21 VITALS — TEMPERATURE: 97.4 F

## 2018-10-21 VITALS — SYSTOLIC BLOOD PRESSURE: 101 MMHG | DIASTOLIC BLOOD PRESSURE: 62 MMHG

## 2018-10-21 LAB
ANION GAP SERPL CALC-SCNC: 14 MMOL/L (ref 10–20)
BUN SERPL-MCNC: 20 MG/DL (ref 9.8–20.1)
CALCIUM SERPL-MCNC: 9 MG/DL (ref 7.8–10.44)
CHLORIDE SERPL-SCNC: 93 MMOL/L (ref 98–107)
CO2 SERPL-SCNC: 35 MMOL/L (ref 23–31)
CREAT CL PREDICTED SERPL C-G-VRATE: 94 ML/MIN (ref 70–130)
GLUCOSE SERPL-MCNC: 124 MG/DL (ref 83–110)
MAGNESIUM SERPL-MCNC: 2.2 MG/DL (ref 1.6–2.6)
POTASSIUM SERPL-SCNC: 4.1 MMOL/L (ref 3.5–5.1)
SODIUM SERPL-SCNC: 138 MMOL/L (ref 136–145)

## 2018-10-21 RX ADMIN — BISOPROLOL FUMARATE AND HYDROCHLOROTHIAZIDE SCH TAB: 6.25; 5 TABLET ORAL at 08:17

## 2018-10-21 RX ADMIN — ASPIRIN SCH MG: 81 TABLET ORAL at 08:18

## 2018-10-21 RX ADMIN — HYDROCODONE BITARTRATE AND ACETAMINOPHEN PRN TAB: 7.5; 325 TABLET ORAL at 08:19

## 2018-10-21 RX ADMIN — HYDROCODONE BITARTRATE AND ACETAMINOPHEN PRN TAB: 7.5; 325 TABLET ORAL at 14:53

## 2018-10-21 RX ADMIN — DOCUSATE SODIUM 50 MG AND SENNOSIDES 8.6 MG SCH TAB: 8.6; 5 TABLET, FILM COATED ORAL at 08:17

## 2018-10-21 RX ADMIN — MULTIPLE VITAMINS W/ MINERALS TAB SCH TAB: TAB at 08:18

## 2018-10-21 NOTE — PDOC.PN
- Subjective


Encounter Start Date: 10/21/18


Encounter Start Time: 10:00





Patient seen and examined for med mngt. Still requiring O2. No new complaints. 

No overnight events





- Objective


MAR Reviewed: Yes


Vital Signs & Weight: 


 Vital Signs (12 hours)











  Temp Pulse Resp BP BP Pulse Ox Pulse Ox


 


 10/21/18 17:01      101/62  


 


 10/21/18 16:33  97.4 F L  56 L  18   84/55 L  94 L 


 


 10/21/18 14:21        85 L


 


 10/21/18 11:35  98.4 F  80  12   109/71  94 L 


 


 10/21/18 09:12  98.7 F  88  12   143/86 H  94 L 


 


 10/21/18 08:18   77     


 


 10/21/18 08:17     127/72   


 


 10/21/18 08:04       94 L 


 


 10/21/18 06:42  98.2 F  77  19   113/67  93 L 














  Pulse Ox


 


 10/21/18 17:01 


 


 10/21/18 16:33 


 


 10/21/18 14:21  93 L


 


 10/21/18 11:35 


 


 10/21/18 09:12 


 


 10/21/18 08:18 


 


 10/21/18 08:17 


 


 10/21/18 08:04 


 


 10/21/18 06:42 








 Weight











Admit Weight                   200 lb


 


Weight                         200 lb














I&O: 


 











 10/20/18 10/21/18 10/22/18





 06:59 06:59 06:59


 


Intake Total 960 1320 600


 


Output Total 2350 1375 


 


Balance -1390 -55 600











Result Diagrams: 


 10/19/18 07:33





 10/21/18 05:26


Radiology Reviewed by me: Yes (CXR (10/20) - improving edema)





Phys Exam





- Physical Examination


Constitutional: NAD


Respiratory: no wheezing, no rales, no rhonchi


Cardiovascular: RRR, no rub


Gastrointestinal: soft, non-tender, positive bowel sounds


Musculoskeletal: no edema


Neurological: moves all 4 limbs





Dx/Plan





- Plan


DVT proph w/SCDs





IMPRESSION:


1. Resp distress due to volume overload - improving/ Suspected diastolic HF - 

acute on chronic


2. HTN 


3. Gout


4. CKD 2


5. Hypokalemia


6. Elevated troponins due to volume overload/demand ischemia/Chronic 

leucocytosis/Obesity BMI 39.1





PLAN:


Cont PO Lasix - Will stop Lasix at dc


Pham dced


Home O2 setup


Cont PRN Nebs


Cont other meds as below 


Sleep study as outpt








Review of Systems





- Review of Systems


Respiratory: negative: Cough, Dry, Shortness of Breath, Hemoptysis, SOB with 

Excertion, Pleuritic Pain, Sputum, Wheezing


Cardiovascular: negative: chest pain, palpitations, orthopnea, paroxysmal 

nocturnal dyspnea, edema, light headedness, other





- Medications/Allergies


Allergies/Adverse Reactions: 


 Allergies











Allergy/AdvReac Type Severity Reaction Status Date / Time


 


Sulfa (Sulfonamide Allergy  Hives Verified 10/15/18 10:06





Antibiotics)     











Medications: 


 Current Medications





Acetaminophen (Tylenol)  650 mg PO Q4H PRN


   PRN Reason: HA/ T > 101F; Mild Pain (1-3)


Hydrocodone Bitart/Acetaminophen (Norco 7.5/325)  1 tab PO Q4H PRN


   PRN Reason: Mild Pain (1-3)


   Last Admin: 10/19/18 10:43 Dose:  1 tab


Hydrocodone Bitart/Acetaminophen (Norco 7.5/325)  2 tab PO Q4H PRN


   PRN Reason: Moderate Pain (4-6)


   Last Admin: 10/21/18 14:53 Dose:  2 tab


Albuterol/Ipratropium (Duoneb)  3 ml NEB [id number] PRN


   PRN Reason: SOB &/or Wheezing


Allopurinol (Zyloprim)  100 mg PO DAILYPRN PRN


   PRN Reason: GOUT


Amlodipine Besylate (Norvasc)  5 mg PO DAILY [facility]


   Last Admin: 10/21/18 08:18 Dose:  5 mg


Aspirin (Ecotrin)  81 mg PO BID [facility]


   Last Admin: 10/21/18 08:18 Dose:  81 mg


Atorvastatin Calcium (Lipitor)  20 mg PO HS [facility]


   Last Admin: 10/20/18 21:07 Dose:  20 mg


Benazepril HCl (Lotensin)  20 mg PO DAILY [facility]


   Last Admin: 10/21/18 08:17 Dose:  20 mg


Bisacodyl (Dulcolax)  10 mg NE DAILYPRN PRN


   PRN Reason: Constipation


   Last Admin: 10/21/18 03:26 Dose:  10 mg


Bisoprolol Fumarate/HCTZ (Ziac 5-6.25)  1 tab PO DAILY [facility]


   Last Admin: 10/21/18 08:17 Dose:  1 tab


Diphenhydramine HCl (Benadryl)  25 mg PO Q6H PRN


   PRN Reason: Itching


Doxazosin Mesylate (Cardura)  4 mg PO HS [facility]


   Last Admin: 10/20/18 21:07 Dose:  4 mg


Famotidine (Pepcid)  20 mg PO QAM [facility]


   Last Admin: 10/21/18 08:17 Dose:  20 mg


Fentanyl (Sublimaze)  50 mcg IV Q1H PRN


   PRN Reason:  BREAKTHROUGH PAIN


Ferrous Gluconate (Fergon)  324 mg PO BID [facility]


   Last Admin: 10/21/18 08:18 Dose:  324 mg


Fish Oil (Fish Oil)  1,000 mg PO DAILY [facility]


   Last Admin: 10/21/18 08:17 Dose:  1,000 mg


Furosemide (Lasix)  20 mg PO DAILY [facility]


   Last Admin: 10/21/18 08:18 Dose:  20 mg


Ropivacaine 250 ml/ Device  250 mls @ 0 mls/hr NERVE BLCK INF [facility]


   Last Admin: 10/17/18 14:21 Dose:  250 mls


Iron/Minerals/Multivitamins (Theragran M)  1 tab PO DAILY [facility]


   Last Admin: 10/21/18 08:18 Dose:  1 tab


Montelukast Sodium (Singulair)  10 mg PO DAILYPRN PRN


   PRN Reason: Allergies


Nitroglycerin (Nitro-Bid 2% Ointment)  0.5 inch TOP Q8HR PRN


   PRN Reason: SBP Greater Than 180


Ondansetron HCl (Zofran)  4 mg IVP Q6H PRN


   PRN Reason: Nausea/Vomiting


   Last Admin: 10/20/18 15:30 Dose:  4 mg


Polyethylene Glycol (Miralax)  17 gm PO DAILY [facility]


   Last Admin: 10/21/18 08:17 Dose:  17 gm


Potassium Chloride (K-Dur)  20 meq PO BID-[facility]


   Last Admin: 10/21/18 16:16 Dose:  20 meq


Promethazine HCl (Phenergan)  12.5 mg IM Q4H PRN


   PRN Reason: Nausea


Senna/Docusate Sodium (Senokot S)  2 tab PO BID [facility]


   Last Admin: 10/21/18 08:17 Dose:  2 tab


Sodium Chloride (Flush - Normal Saline)  10 ml IVF PRN PRN


   PRN Reason: Saline Flush


Tramadol HCl (Ultram)  50 mg PO Q6H PRN


   PRN Reason: Mild Pain (1-3)


Tramadol HCl (Ultram)  100 mg PO Q6H PRN


   PRN Reason: Moderate Pain 4-6


   Last Admin: 10/21/18 10:08 Dose:  100 mg


Zolpidem Tartrate (Ambien)  5 mg PO HSPRN PRN


   PRN Reason: Insomnia